# Patient Record
Sex: MALE | Race: WHITE | Employment: OTHER | URBAN - METROPOLITAN AREA
[De-identification: names, ages, dates, MRNs, and addresses within clinical notes are randomized per-mention and may not be internally consistent; named-entity substitution may affect disease eponyms.]

---

## 2020-08-11 ENCOUNTER — OFFICE VISIT (OUTPATIENT)
Dept: ORTHOPEDIC SURGERY | Age: 75
End: 2020-08-11

## 2020-08-11 VITALS — WEIGHT: 180 LBS | HEIGHT: 70 IN | BODY MASS INDEX: 25.77 KG/M2

## 2020-08-11 DIAGNOSIS — M25.562 LEFT KNEE PAIN, UNSPECIFIED CHRONICITY: Primary | ICD-10-CM

## 2020-08-11 DIAGNOSIS — M25.569 CHRONIC KNEE PAIN, UNSPECIFIED LATERALITY: ICD-10-CM

## 2020-08-11 DIAGNOSIS — Z79.01 CHRONIC ANTICOAGULATION: ICD-10-CM

## 2020-08-11 DIAGNOSIS — I48.11 LONGSTANDING PERSISTENT ATRIAL FIBRILLATION (HCC): ICD-10-CM

## 2020-08-11 DIAGNOSIS — G89.29 CHRONIC KNEE PAIN, UNSPECIFIED LATERALITY: ICD-10-CM

## 2020-08-11 DIAGNOSIS — M17.12 PRIMARY OSTEOARTHRITIS OF LEFT KNEE: Primary | ICD-10-CM

## 2020-08-11 RX ORDER — CEPHALEXIN 500 MG/1
500 CAPSULE ORAL 4 TIMES DAILY
Qty: 4 CAP | Refills: 0 | Status: CANCELLED | OUTPATIENT
Start: 2020-08-11 | End: 2020-08-12

## 2020-08-11 RX ORDER — AMLODIPINE BESYLATE 10 MG/1
1 TABLET ORAL DAILY
COMMUNITY
Start: 2019-10-28

## 2020-08-11 RX ORDER — OXYCODONE AND ACETAMINOPHEN 5; 325 MG/1; MG/1
1 TABLET ORAL
Qty: 30 TAB | Refills: 0 | Status: CANCELLED | OUTPATIENT
Start: 2020-08-11 | End: 2020-08-18

## 2020-08-11 RX ORDER — NALOXONE HYDROCHLORIDE 4 MG/.1ML
SPRAY NASAL
COMMUNITY

## 2020-08-11 RX ORDER — METOPROLOL TARTRATE 25 MG/1
TABLET, FILM COATED ORAL
COMMUNITY
Start: 2020-07-20

## 2020-08-11 RX ORDER — GABAPENTIN 300 MG/1
900 CAPSULE ORAL
COMMUNITY

## 2020-08-11 RX ORDER — TADALAFIL 5 MG/1
5 TABLET ORAL DAILY
COMMUNITY
Start: 2020-07-13

## 2020-08-11 RX ORDER — BUSPIRONE HYDROCHLORIDE 15 MG/1
15 TABLET ORAL 2 TIMES DAILY
COMMUNITY
Start: 2019-06-30

## 2020-08-11 RX ORDER — OXYCODONE AND ACETAMINOPHEN 5; 325 MG/1; MG/1
TABLET ORAL
COMMUNITY
End: 2020-08-12 | Stop reason: SDUPTHER

## 2020-08-11 RX ORDER — ACETAMINOPHEN AND CODEINE PHOSPHATE 300; 30 MG/1; MG/1
TABLET ORAL
COMMUNITY

## 2020-08-11 RX ORDER — ESZOPICLONE 3 MG/1
3 TABLET, FILM COATED ORAL
COMMUNITY

## 2020-08-11 RX ORDER — SERTRALINE HYDROCHLORIDE 50 MG/1
50 TABLET, FILM COATED ORAL DAILY
COMMUNITY

## 2020-08-11 RX ORDER — MELOXICAM 15 MG/1
15 TABLET ORAL DAILY
COMMUNITY
End: 2020-11-16 | Stop reason: ALTCHOICE

## 2020-08-11 RX ORDER — DESMOPRESSIN ACETATE 55.3 UG/1
55.3 TABLET SUBLINGUAL
COMMUNITY
Start: 2020-07-13

## 2020-08-11 RX ORDER — CLINDAMYCIN HYDROCHLORIDE 300 MG/1
300 CAPSULE ORAL 3 TIMES DAILY
Qty: 3 CAP | Refills: 0 | Status: SHIPPED | OUTPATIENT
Start: 2020-08-11 | End: 2020-08-12 | Stop reason: SDUPTHER

## 2020-08-11 RX ORDER — CLINDAMYCIN HYDROCHLORIDE 300 MG/1
300 CAPSULE ORAL 3 TIMES DAILY
Qty: 18 CAP | Refills: 0 | Status: SHIPPED | OUTPATIENT
Start: 2020-08-11 | End: 2020-08-12 | Stop reason: SDUPTHER

## 2020-08-11 RX ORDER — HYDROCODONE BITARTRATE AND ACETAMINOPHEN 7.5; 325 MG/1; MG/1
TABLET ORAL
COMMUNITY

## 2020-08-11 RX ORDER — ALPRAZOLAM 0.25 MG/1
TABLET ORAL
COMMUNITY

## 2020-08-11 NOTE — PROGRESS NOTES
[employer] and [facility]  Preop Visit    Subjective:     Shea Caballero is a 69 y.o. male who presents today for preoperative visit in preparation for upcoming left TKA  . Xrays and additional studies, as appropriate, have been reviewed. Pt currently without complaint. Previous medical history    1. Anxiety  2. Atrial fibrillation on chronic anticoagulation with Eliquis  3. Benign prostatic hyperplasia  4.  carotid stenosis bilateral  5.  chronic pain syndrome; I have explained to patient that we will provide pain management with regard to acute pain from surgery for 4 to 6 weeks at which point he will have to return to his pain specialist and he voices an understanding. 6.  Current use of long-term anticoagulation  7. Eczema  8. Hypertension  9. History of kidney stones  10. Mitral regurgitation and mitral valve prolapse  11. Recurrent major depressive disorder      The patient has had  11 back surgeries      Allergies   Allergen Reactions    Cefuroxime Axetil Anaphylaxis    Cephalexin Other (comments)    Eszopiclone Other (comments)     Cannot take generic version of Lunesta  Cannot take generic version of Lunesta         ROS:    Patient is a pleasant appearing individual, appropriately dressed, well hydrated, well nourished, who is alert, appropriately oriented for age, and in no acute distress with a limp gait and normal affect who does not appear to be in any significant pain. Remainder of ROS as per HPI. Objective:     Physical Exam:  VSS AFEB    Right knee - Neurovascularly intact with good cap refill, full range of motion and full strength, well healed incision noted, no swelling, no erythema, no instability.      Left knee - Decrease range of motion with flexion, Some crepitation, Grossly neurovascularly intact, Good cap refill,  Moderate swelling, No gross instability, Some quadriceps weakness, the patient does have several small red marks on his knee above his patella. He states that multiple years ago he fell into a gravel pit and every once in a while picks out a piece of gravel. This is in the operative field. I have prescribed antibiotics up until surgery. EKG: Atrial fibrillation    2 weeks prior to this visit the patient had a syncopal episode related to his atrial fibrillation. The patient was seen by his cardiologist who performed cardiac clearance as outlined below. Cardiac evaluation: Patient was diagnosed with atrial fibrillation. His cardiologist states that he should discontinue his Eliquis 2 days prior to his scheduled orthopedic procedure and resume as soon as Dr. Olga Disla feels appropriate. He is to remain on his Lopressor throughout his surgical course and that currently there are no contraindications to undergoing any needed procedure. This evaluation is performed by Dr. Devang Wright. General Medicine evaluation: Patient has been cleared by [facility] family medicine physicians who state there are no contraindications to planned surgery. Post operative Antibiotics: Called in, Keflex  Post operative Pain Medications: Called in, oxycodone  Post operative Blood thinning meds: Eliquis    Post operative medications, DME and physical therapy (if indicated) prescriptions have been provided. (Meds sent to pharmacy)    Assessment:   Primary osteoarthritis of knee, unspecified laterality [M17.10]   Orders Placed This Encounter    apixaban (ELIQUIS) 5 mg tablet     Sig: Take 5 mg by mouth.  amLODIPine (NORVASC) 10 mg tablet     Sig: Take 1 Tab by mouth daily.  ALPRAZolam (XANAX) 0.25 mg tablet     Sig: alprazolam 0.25 mg tablet   TAKE 1 TABLET BY MOUTH TWICE DAILY AS NEEDED FOR SEVERE ANXIETY AND PANIC AS DIRECTED    acetaminophen-codeine (TYLENOL #3) 300-30 mg per tablet     Sig: acetaminophen 300 mg-codeine 30 mg tablet    busPIRone (BUSPAR) 15 mg tablet     Sig: Take 15 mg by mouth two (2) times a day.     desmopressin (Nocdurna, men,) 55.3 mcg TbDi     Si.3 mcg by SubLINGual route.  eszopiclone (LUNESTA) 3 mg tablet     Sig: Take 3 mg by mouth.  gabapentin (NEURONTIN) 300 mg capsule     Sig: Take 900 mg by mouth.  HYDROcodone-acetaminophen (Norco) 7.5-325 mg per tablet     Sig: Norco 7.5 mg-325 mg tablet   Take 1 tablet 3 times a day by oral route as needed for 20 days.  meloxicam (MOBIC) 15 mg tablet     Sig: Take 15 mg by mouth daily.  metoprolol tartrate (LOPRESSOR) 25 mg tablet    naloxone (Narcan) 4 mg/actuation nasal spray     Sig: Narcan 4 mg/actuation nasal spray    oxyCODONE-acetaminophen (PERCOCET) 5-325 mg per tablet     Sig: oxycodone-acetaminophen 5 mg-325 mg tablet    sertraline (ZOLOFT) 50 mg tablet     Sig: Take 50 mg by mouth daily.  tadalafiL (CIALIS) 5 mg tablet     Sig: Take 5 mg by mouth daily.  clindamycin (CLEOCIN) 300 mg capsule     Sig: Take 1 Cap by mouth three (3) times daily for 1 day. Take starting day after surgery. Dispense:  3 Cap     Refill:  0    clindamycin (CLEOCIN) 300 mg capsule     Sig: Take 1 Cap by mouth three (3) times daily for 6 days. Start TODAY. Dispense:  18 Cap     Refill:  0       Please note that this patient will be scheduled for surgery and that Dr. Max Gunn is requesting a pre-op medical clearance for GENERAL Anesthesia. Our office will schedule the appt for the medical clearance with the medical provider. After evaluating the patient, please fax all labs, EKGs, diagnostic studies, and a risk assessment / clearance note (that is legible or typed ) indicating if the patient is medically cleared to Dr. Max Gunn (Attn: Surgery Scheduling) ASA to [phone]. If the patient is not or will not be medically cleared prior to the surgery date please notify Dr. Gallo Pulling office. Thank you for assisting me in this patient's care.    The patient was counseled about the risks of margot Covid-19 during their perioperative period and any recovery window from their procedure. The patient was made aware that margot Covid-19 may worsen their prognosis for recovering from their procedure and lend to a higher morbidity and/or mortality risk. All material risks, benefits, and reasonable alternatives including postponing the procedure were discussed. The patient DOES wish to proceed with their procedure at this time. Yenny Kapoor is a 69 y.o. male who is planning to undergo a left TKA by  Dr. Dana Kan in the near future. We will plan to proceed on an inpatient basis based on the patient's history, however, the patient would very much like to be outpatient so we will confer with Dr. Dana Kan and anesthesia at the time of surgery and make a decision at that time. In the interim we will list him as inpatient but provide him home medications should he be switched to outpatient at the time of surgery. He is agreeable with this plan. At this time, they are an appropriate candidate for surgery. The risks, benefits, complications and alternatives have been outlined with the patient at length and they voice an understanding. Based on the fact that we agree that the potential benefits outweigh the potential lists, we will plan to proceed as discussed. Mr. Jack Quinonez has a reminder for a \"due or due soon\" health maintenance. I have asked that he contact his primary care provider for follow-up on this health maintenance.     Problem List Items Addressed This Visit     None      Visit Diagnoses     Primary osteoarthritis of left knee    -  Primary    Relevant Medications    acetaminophen-codeine (TYLENOL #3) 300-30 mg per tablet    HYDROcodone-acetaminophen (Norco) 7.5-325 mg per tablet    meloxicam (MOBIC) 15 mg tablet    oxyCODONE-acetaminophen (PERCOCET) 5-325 mg per tablet    clindamycin (CLEOCIN) 300 mg capsule    clindamycin (CLEOCIN) 300 mg capsule    Chronic knee pain, unspecified laterality        Relevant Medications    clindamycin (CLEOCIN) 300 mg capsule clindamycin (CLEOCIN) 300 mg capsule    Longstanding persistent atrial fibrillation (HCC)        Relevant Medications    apixaban (ELIQUIS) 5 mg tablet    amLODIPine (NORVASC) 10 mg tablet    metoprolol tartrate (LOPRESSOR) 25 mg tablet    Chronic anticoagulation            Plan:   -Proceed with Left TKA  to be performed by DR. Carly Brower at [facility].  He will tentatively be performed on an inpatient basis given his medical history and recent syncopal episode  -Preoperative instructions have been reviewed with and provided to the patient, at length  -Patient voices understanding that any skin abnormality to the involved area of planned surgery may result in cancellation and postponement of surgery.  -Physical therapy, if indicated,  to start shortly after surgery.  -Follow up 1 week postoperatively with me for close follow up.     Adarsh Loja, MS, PAEverC

## 2020-08-12 DIAGNOSIS — M25.569 KNEE PAIN, UNSPECIFIED CHRONICITY, UNSPECIFIED LATERALITY: Primary | ICD-10-CM

## 2020-08-12 DIAGNOSIS — M17.10 PRIMARY OSTEOARTHRITIS OF KNEE, UNSPECIFIED LATERALITY: ICD-10-CM

## 2020-08-12 RX ORDER — CLINDAMYCIN HYDROCHLORIDE 300 MG/1
300 CAPSULE ORAL 3 TIMES DAILY
Qty: 21 CAP | Refills: 0 | Status: SHIPPED | OUTPATIENT
Start: 2020-08-12 | End: 2020-08-19

## 2020-08-12 RX ORDER — OXYCODONE AND ACETAMINOPHEN 5; 325 MG/1; MG/1
1 TABLET ORAL
Qty: 30 TAB | Refills: 0 | Status: SHIPPED | OUTPATIENT
Start: 2020-08-12 | End: 2020-08-19

## 2020-08-18 DIAGNOSIS — M17.12 PRIMARY OSTEOARTHRITIS OF LEFT KNEE: Primary | ICD-10-CM

## 2020-08-18 NOTE — PROGRESS NOTES
Name: Papito Tobar    : 1945  Service Dept: [facility] MEDICINE         No chief complaint on file. Patient's Pharmacies:    [address] [address] [address] [place]  [address] [place], [place] [address]  Phone: [phone] Fax: [phone]       There were no vitals taken for this visit. Allergies   Allergen Reactions    Cefuroxime Axetil Anaphylaxis    Cephalexin Other (comments)    Eszopiclone Other (comments)     Cannot take generic version of Lunesta  Cannot take generic version of Lunesta          Current Outpatient Medications   Medication Sig Dispense Refill    oxyCODONE-acetaminophen (Percocet) 5-325 mg per tablet Take 1 Tab by mouth every four to six (4-6) hours as needed for Pain for up to 7 days. Max Daily Amount: 6 Tabs. 30 Tab 0    clindamycin (CLEOCIN) 300 mg capsule Take 1 Cap by mouth three (3) times daily for 7 days. Start today and continue with one day postop. 21 Cap 0    apixaban (ELIQUIS) 5 mg tablet Take 5 mg by mouth.  amLODIPine (NORVASC) 10 mg tablet Take 1 Tab by mouth daily.  ALPRAZolam (XANAX) 0.25 mg tablet alprazolam 0.25 mg tablet   TAKE 1 TABLET BY MOUTH TWICE DAILY AS NEEDED FOR SEVERE ANXIETY AND PANIC AS DIRECTED      acetaminophen-codeine (TYLENOL #3) 300-30 mg per tablet acetaminophen 300 mg-codeine 30 mg tablet      busPIRone (BUSPAR) 15 mg tablet Take 15 mg by mouth two (2) times a day.  desmopressin (Nocdurna, men,) 55.3 mcg TbDi 55.3 mcg by SubLINGual route.  eszopiclone (LUNESTA) 3 mg tablet Take 3 mg by mouth.  gabapentin (NEURONTIN) 300 mg capsule Take 900 mg by mouth.  HYDROcodone-acetaminophen (Norco) 7.5-325 mg per tablet Norco 7.5 mg-325 mg tablet   Take 1 tablet 3 times a day by oral route as needed for 20 days.  meloxicam (MOBIC) 15 mg tablet Take 15 mg by mouth daily.       metoprolol tartrate (LOPRESSOR) 25 mg tablet       naloxone (Narcan) 4 mg/actuation nasal spray Narcan 4 mg/actuation nasal spray      sertraline (ZOLOFT) 50 mg tablet Take 50 mg by mouth daily.  tadalafiL (CIALIS) 5 mg tablet Take 5 mg by mouth daily. There is no problem list on file for this patient. No family history on file. Social History     Socioeconomic History    Marital status:      Spouse name: Not on file    Number of children: Not on file    Years of education: Not on file    Highest education level: Not on file        No past surgical history on file. No past medical history on file. HPI:   I have reviewed and agree with PFSH and ROS and intake form in chart and the record. Orders Only - link in [facility] in error       Return to Office:    Follow-up Information    None

## 2020-08-19 ENCOUNTER — OFFICE VISIT (OUTPATIENT)
Dept: ORTHOPEDIC SURGERY | Age: 75
End: 2020-08-19
Payer: MEDICARE

## 2020-08-19 DIAGNOSIS — M25.562 LEFT KNEE PAIN, UNSPECIFIED CHRONICITY: Primary | ICD-10-CM

## 2020-08-19 PROCEDURE — 99024 POSTOP FOLLOW-UP VISIT: CPT | Performed by: ORTHOPAEDIC SURGERY

## 2020-08-19 PROCEDURE — 73564 X-RAY EXAM KNEE 4 OR MORE: CPT | Performed by: ORTHOPAEDIC SURGERY

## 2020-08-20 LAB — CREATININE, EXTERNAL: 1.1

## 2020-08-21 VITALS — HEIGHT: 70 IN | BODY MASS INDEX: 25.05 KG/M2 | WEIGHT: 175 LBS

## 2020-08-21 NOTE — PROGRESS NOTES
Name: Chidi Bro    : 1945  Service Dept: [address] MEDICINE         No chief complaint on file. Patient's Pharmacies:    [facility] [address], [address] [place]  [address] [place] [address]  Phone: [phone] Fax: [phone]       There were no vitals taken for this visit. Allergies   Allergen Reactions    Cefuroxime Axetil Anaphylaxis    Cephalexin Other (comments)    Eszopiclone Other (comments)     Cannot take generic version of Lunesta  Cannot take generic version of Lunesta          Current Outpatient Medications   Medication Sig Dispense Refill    amLODIPine (NORVASC) 10 mg tablet Take 1 Tab by mouth daily.  ALPRAZolam (XANAX) 0.25 mg tablet alprazolam 0.25 mg tablet   TAKE 1 TABLET BY MOUTH TWICE DAILY AS NEEDED FOR SEVERE ANXIETY AND PANIC AS DIRECTED      acetaminophen-codeine (TYLENOL #3) 300-30 mg per tablet acetaminophen 300 mg-codeine 30 mg tablet      busPIRone (BUSPAR) 15 mg tablet Take 15 mg by mouth two (2) times a day.  desmopressin (Nocdurna, men,) 55.3 mcg TbDi 55.3 mcg by SubLINGual route.  eszopiclone (LUNESTA) 3 mg tablet Take 3 mg by mouth.  gabapentin (NEURONTIN) 300 mg capsule Take 900 mg by mouth.  HYDROcodone-acetaminophen (Norco) 7.5-325 mg per tablet Norco 7.5 mg-325 mg tablet   Take 1 tablet 3 times a day by oral route as needed for 20 days.  meloxicam (MOBIC) 15 mg tablet Take 15 mg by mouth daily.  metoprolol tartrate (LOPRESSOR) 25 mg tablet       naloxone (Narcan) 4 mg/actuation nasal spray Narcan 4 mg/actuation nasal spray      sertraline (ZOLOFT) 50 mg tablet Take 50 mg by mouth daily.  tadalafiL (CIALIS) 5 mg tablet Take 5 mg by mouth daily. There is no problem list on file for this patient. No family history on file.      Social History     Socioeconomic History    Marital status:      Spouse name: Not on file    Number of children: Not on file    Years of education: Not on file    Highest education level: Not on file        No past surgical history on file. No past medical history on file. I have reviewed and agree with [address] and ROS and intake form in chart and the record. Review of Systems:   Patient is a pleasant appearing individual, appropriately dressed, well hydrated, well nourished, who is alert, appropriately oriented for age, and in no acute distress with a wheelchair bound gait and normal affect who does not appear to be in any significant pain. xrays revealed well placed prosthesis with no fractures. Scribed by Clair Epley as dictated by Jeanmarie Edouard. Jose Sanchez MD.    Physical examination shows well-healing incision, good capillary refill, grossly neurovascularly intact, no instability. HPI:  The patient is here status post left total knee replacement, was having some issues with lightheadedness. We brought him into the office. X-rays of the left knee, AP, lateral and obliques are unremarkable. Assessment/Plan:  Plan at this point, he is having some vasovagal like symptoms. Plan will be to send him to the emergency room and go from there. Return to Office: Follow-up Information    None             Documentation True and Accepted Leroy Sanchez MD

## 2020-08-21 NOTE — PATIENT INSTRUCTIONS
Knee Pain or Injury: Care Instructions Your Care Instructions Injuries are a common cause of knee problems. Sudden (acute) injuries may be caused by a direct blow to the knee. They can also be caused by abnormal twisting, bending, or falling on the knee. Pain, bruising, or swelling may be severe, and may start within minutes of the injury. Overuse is another cause of knee pain. Other causes are climbing stairs, kneeling, and other activities that use the knee. Everyday wear and tear, especially as you get older, also can cause knee pain. Rest, along with home treatment, often relieves pain and allows your knee to heal. If you have a serious knee injury, you may need tests and treatment. Follow-up care is a key part of your treatment and safety. Be sure to make and go to all appointments, and call your doctor if you are having problems. It's also a good idea to know your test results and keep a list of the medicines you take. How can you care for yourself at home? · Be safe with medicines. Read and follow all instructions on the label. ? If the doctor gave you a prescription medicine for pain, take it as prescribed. ? If you are not taking a prescription pain medicine, ask your doctor if you can take an over-the-counter medicine. · Rest and protect your knee. Take a break from any activity that may cause pain. · Put ice or a cold pack on your knee for 10 to 20 minutes at a time. Put a thin cloth between the ice and your skin. · Prop up a sore knee on a pillow when you ice it or anytime you sit or lie down for the next 3 days. Try to keep it above the level of your heart. This will help reduce swelling. · If your knee is not swollen, you can put moist heat, a heating pad, or a warm cloth on your knee. · If your doctor recommends an elastic bandage, sleeve, or other type of support for your knee, wear it as directed.  
· Follow your doctor's instructions about how much weight you can put on your leg. Use a cane, crutches, or a walker as instructed. · Follow your doctor's instructions about activity during your healing process. If you can do mild exercise, slowly increase your activity. · Reach and stay at a healthy weight. Extra weight can strain the joints, especially the knees and hips, and make the pain worse. Losing even a few pounds may help. When should you call for help? [id number] anytime you think you may need emergency care. For example, call if: 
· You have symptoms of a blood clot in your lung (called a pulmonary embolism). These may include: 
? Sudden chest pain. ? Trouble breathing. ? Coughing up blood. Call your doctor now or seek immediate medical care if: 
· You have severe or increasing pain. · Your leg or foot turns cold or changes color. · You cannot stand or put weight on your knee. · Your knee looks twisted or bent out of shape. · You cannot move your knee. · You have signs of infection, such as: 
? Increased pain, swelling, warmth, or redness. ? Red streaks leading from the knee. ? Pus draining from a place on your knee. ? A fever. · You have signs of a blood clot in your leg (called a deep vein thrombosis), such as: 
? Pain in your calf, back of the knee, thigh, or groin. ? Redness and swelling in your leg or groin. Watch closely for changes in your health, and be sure to contact your doctor if: 
· You have tingling, weakness, or numbness in your knee. · You have any new symptoms, such as swelling. · You have bruises from a knee injury that last longer than 2 weeks. · You do not get better as expected. Where can you learn more? Go to http://yemi-landy.info/ Enter K195 in the search box to learn more about \"Knee Pain or Injury: Care Instructions. \" Current as of: June 26, 2019               Content Version: 12.5 © 4282-3157 [employer]wise, [employer]orporated.   
Care instructions adapted under license by [employer] (which disclaims liability or warranty for this information). If you have questions about a medical condition or this instruction, always ask your healthcare professional. [address] any warranty or liability for your use of this information.

## 2020-08-24 ENCOUNTER — TELEPHONE (OUTPATIENT)
Dept: ORTHOPEDIC SURGERY | Age: 75
End: 2020-08-24

## 2020-08-24 NOTE — TELEPHONE ENCOUNTER
LT TKR 08/17/2020. . IS DIANA IN [facility] ICU HE IS NOT SURE WHAT RM HE IS IN BUT HE WOULD LIKE FOR SOMEONE TO COME OVER AND LOOK AT THIS INCISION.

## 2020-09-01 ENCOUNTER — OFFICE VISIT (OUTPATIENT)
Dept: ORTHOPEDIC SURGERY | Age: 75
End: 2020-09-01
Payer: MEDICARE

## 2020-09-01 VITALS — TEMPERATURE: 97.8 F

## 2020-09-01 DIAGNOSIS — Z96.652 HISTORY OF TOTAL LEFT KNEE REPLACEMENT: ICD-10-CM

## 2020-09-01 DIAGNOSIS — M25.562 LEFT KNEE PAIN, UNSPECIFIED CHRONICITY: Primary | ICD-10-CM

## 2020-09-01 DIAGNOSIS — M17.12 PRIMARY OSTEOARTHRITIS OF LEFT KNEE: ICD-10-CM

## 2020-09-01 PROCEDURE — 99024 POSTOP FOLLOW-UP VISIT: CPT | Performed by: ORTHOPAEDIC SURGERY

## 2020-09-01 RX ORDER — HYDROMORPHONE HYDROCHLORIDE 2 MG/1
2 TABLET ORAL
Qty: 30 TAB | Refills: 0 | Status: SHIPPED | OUTPATIENT
Start: 2020-09-01 | End: 2020-09-08

## 2020-09-01 NOTE — PATIENT INSTRUCTIONS
Knee Pain or Injury: Care Instructions  Your Care Instructions     Injuries are a common cause of knee problems. Sudden (acute) injuries may be caused by a direct blow to the knee. They can also be caused by abnormal twisting, bending, or falling on the knee. Pain, bruising, or swelling may be severe, and may start within minutes of the injury. Overuse is another cause of knee pain. Other causes are climbing stairs, kneeling, and other activities that use the knee. Everyday wear and tear, especially as you get older, also can cause knee pain. Rest, along with home treatment, often relieves pain and allows your knee to heal. If you have a serious knee injury, you may need tests and treatment. Follow-up care is a key part of your treatment and safety. Be sure to make and go to all appointments, and call your doctor if you are having problems. It's also a good idea to know your test results and keep a list of the medicines you take. How can you care for yourself at home? · Be safe with medicines. Read and follow all instructions on the label. ? If the doctor gave you a prescription medicine for pain, take it as prescribed. ? If you are not taking a prescription pain medicine, ask your doctor if you can take an over-the-counter medicine. · Rest and protect your knee. Take a break from any activity that may cause pain. · Put ice or a cold pack on your knee for 10 to 20 minutes at a time. Put a thin cloth between the ice and your skin. · Prop up a sore knee on a pillow when you ice it or anytime you sit or lie down for the next 3 days. Try to keep it above the level of your heart. This will help reduce swelling. · If your knee is not swollen, you can put moist heat, a heating pad, or a warm cloth on your knee. · If your doctor recommends an elastic bandage, sleeve, or other type of support for your knee, wear it as directed.   · Follow your doctor's instructions about how much weight you can put on your leg. Use a cane, crutches, or a walker as instructed. · Follow your doctor's instructions about activity during your healing process. If you can do mild exercise, slowly increase your activity. · Reach and stay at a healthy weight. Extra weight can strain the joints, especially the knees and hips, and make the pain worse. Losing even a few pounds may help. When should you call for help? [id number] anytime you think you may need emergency care. For example, call if:  · You have symptoms of a blood clot in your lung (called a pulmonary embolism). These may include:  ? Sudden chest pain. ? Trouble breathing. ? Coughing up blood. Call your doctor now or seek immediate medical care if:  · You have severe or increasing pain. · Your leg or foot turns cold or changes color. · You cannot stand or put weight on your knee. · Your knee looks twisted or bent out of shape. · You cannot move your knee. · You have signs of infection, such as:  ? Increased pain, swelling, warmth, or redness. ? Red streaks leading from the knee. ? Pus draining from a place on your knee. ? A fever. · You have signs of a blood clot in your leg (called a deep vein thrombosis), such as:  ? Pain in your calf, back of the knee, thigh, or groin. ? Redness and swelling in your leg or groin. Watch closely for changes in your health, and be sure to contact your doctor if:  · You have tingling, weakness, or numbness in your knee. · You have any new symptoms, such as swelling. · You have bruises from a knee injury that last longer than 2 weeks. · You do not get better as expected. Where can you learn more? Go to http://www.gray.com/  Enter K195 in the search box to learn more about \"Knee Pain or Injury: Care Instructions. \"  Current as of: June 26, 2019               Content Version: 12.5  © 9350-6609 Healthwise, Incorporated.    Care instructions adapted under license by [employer] (which disclaims liability or warranty for this information). If you have questions about a medical condition or this instruction, always ask your healthcare professional. [address] [address] any warranty or liability for your use of this information.

## 2020-09-01 NOTE — PROGRESS NOTES
Name: Anais Serrano    : 1945  Service Dept: [address] MEDICINE         Chief Complaint   Patient presents with    Surgical Follow-up        Patient's Pharmacies:    [facility] [address], [address] [place]  [address] [place] [address]  [place] [id number]  Phone: [phone] Fax: [phone]       Visit Vitals  Temp 97.8 °F (36.6 °C)        Allergies   Allergen Reactions    Cefuroxime Axetil Anaphylaxis    Cephalexin Other (comments)    Eszopiclone Other (comments)     Cannot take generic version of Lunesta  Cannot take generic version of Lunesta          Current Outpatient Medications   Medication Sig Dispense Refill    HYDROmorphone (Dilaudid) 2 mg tablet Take 1 Tab by mouth every four to six (4-6) hours as needed for Pain (May take second tab for severe pain.) for up to 7 days. Max Daily Amount: 12 mg. 30 Tab 0    amLODIPine (NORVASC) 10 mg tablet Take 1 Tab by mouth daily.  ALPRAZolam (XANAX) 0.25 mg tablet alprazolam 0.25 mg tablet   TAKE 1 TABLET BY MOUTH TWICE DAILY AS NEEDED FOR SEVERE ANXIETY AND PANIC AS DIRECTED      acetaminophen-codeine (TYLENOL #3) 300-30 mg per tablet acetaminophen 300 mg-codeine 30 mg tablet      busPIRone (BUSPAR) 15 mg tablet Take 15 mg by mouth two (2) times a day.  desmopressin (Nocdurna, men,) 55.3 mcg TbDi 55.3 mcg by SubLINGual route.  eszopiclone (LUNESTA) 3 mg tablet Take 3 mg by mouth.  gabapentin (NEURONTIN) 300 mg capsule Take 900 mg by mouth.  HYDROcodone-acetaminophen (Norco) 7.5-325 mg per tablet Norco 7.5 mg-325 mg tablet   Take 1 tablet 3 times a day by oral route as needed for 20 days.  meloxicam (MOBIC) 15 mg tablet Take 15 mg by mouth daily.  metoprolol tartrate (LOPRESSOR) 25 mg tablet       naloxone (Narcan) 4 mg/actuation nasal spray Narcan 4 mg/actuation nasal spray      sertraline (ZOLOFT) 50 mg tablet Take 50 mg by mouth daily.       tadalafiL (CIALIS) 5 mg tablet Take 5 mg by mouth daily. There is no problem list on file for this patient. Family History   Problem Relation Age of Onset    No Known Problems Mother     No Known Problems Father         Social History     Socioeconomic History    Marital status:      Spouse name: Not on file    Number of children: Not on file    Years of education: Not on file    Highest education level: Not on file   Tobacco Use    Smoking status: Former Smoker    Smokeless tobacco: Never Used   Substance and Sexual Activity    Alcohol use: Never     Frequency: Never        No past surgical history on file. Past Medical History:   Diagnosis Date    Arthritis     Hypertension         HPI:   I have reviewed and agree with PFSH and ROS and intake form in chart and the record. Review of Systems:   Patient is a pleasant appearing individual, appropriately dressed, well hydrated, well nourished, who is alert, appropriately oriented for age, and in no acute distress with a cane based gait and normal affect who does not appear to be in any significant pain. Physical Exam:  Left knee - Neurovascularly intact with good cap refill, full range of motion and full strength, well healed incision noted, no swelling, no erythema, no instability. Right knee - Decrease range of motion with flexion, Some crepitation, Grossly neurovascularly intact, Good cap refill, No skin lesion, Moderate swelling, No gross instability, Some quadriceps weakness     HPI:  The patient is here status post left total knee replacement, doing well, just a little bit of soreness. He is much improved compared to before. Assessment/Plan:  Plan at this point he will see my PA in about 4-6 weeks and then he will go to yearly appointment. No more stockings needed, no restrictions in the meantime and go from there. If he gets worse he will give me a call. Return to Office:    Follow-up Information    None

## 2020-09-01 NOTE — PROGRESS NOTES
Providers protocol for the intake nurse to complete in patient's chart:    Norm Flurry presents today for   Chief Complaint   Patient presents with    Surgical Follow-up     No intake information available.     Reason for visit  Pain assessment   Height  Weight    Temperature is taken by [facility]  Travel Screening done by [facility]    Provider will complete the patient's chart

## 2020-09-02 DIAGNOSIS — M17.12 PRIMARY OSTEOARTHRITIS OF LEFT KNEE: Primary | ICD-10-CM

## 2020-09-11 ENCOUNTER — TELEPHONE (OUTPATIENT)
Dept: ORTHOPEDIC SURGERY | Age: 75
End: 2020-09-11

## 2020-09-11 DIAGNOSIS — Z96.652 HISTORY OF TOTAL LEFT KNEE REPLACEMENT: Primary | ICD-10-CM

## 2020-09-11 DIAGNOSIS — M25.562 LEFT KNEE PAIN, UNSPECIFIED CHRONICITY: ICD-10-CM

## 2020-09-11 RX ORDER — OXYCODONE AND ACETAMINOPHEN 5; 325 MG/1; MG/1
1 TABLET ORAL
Qty: 30 TAB | Refills: 0 | Status: SHIPPED | OUTPATIENT
Start: 2020-09-11 | End: 2020-09-18

## 2020-09-11 NOTE — TELEPHONE ENCOUNTER
YOU PUT IN TODAY AN RX FOR PERCOCET BUT MR DILLION CALLED AND SAID IT WAS SUPPOSE TO BE FOR DAULIDID  HE DID NOT  THE PERCOCET

## 2020-09-13 DIAGNOSIS — M25.562 LEFT KNEE PAIN, UNSPECIFIED CHRONICITY: Primary | ICD-10-CM

## 2020-09-13 DIAGNOSIS — Z96.652 HISTORY OF TOTAL LEFT KNEE REPLACEMENT: ICD-10-CM

## 2020-09-13 DIAGNOSIS — M17.12 PRIMARY OSTEOARTHRITIS OF LEFT KNEE: ICD-10-CM

## 2020-09-13 RX ORDER — HYDROMORPHONE HYDROCHLORIDE 2 MG/1
2 TABLET ORAL
Qty: 30 TAB | Refills: 0 | Status: SHIPPED | OUTPATIENT
Start: 2020-09-13 | End: 2020-09-20

## 2020-09-14 ENCOUNTER — HOSPITAL ENCOUNTER (OUTPATIENT)
Dept: PHYSICAL THERAPY | Age: 75
Discharge: HOME | End: 2020-09-14
Payer: MEDICARE

## 2020-09-14 PROCEDURE — 97110 THERAPEUTIC EXERCISES: CPT

## 2020-09-14 NOTE — PROGRESS NOTES
PT DAILY TREATMENT NOTE     Patient Name: Branden Diggs  Date:2020  : 1945  [x]  Patient  Verified  Payor: Idolina Babinski / Plan: [place] MED[place] & B / Product Type: Medicare /    In time:1309  Out time:1348  Total Treatment Time (min): 39  Total Timed Codes (min): 38    Treatment Area: Unilateral primary osteoarthritis, left knee [M17.12]    SUBJECTIVE  Pain Level (0-10 scale): 7/10, Pt states that pain doesn't seem to get any better. Any medication changes, allergies to medications, adverse drug reactions, diagnosis change, or new procedure performed?: [x] No    [] Yes (see summary sheet for update)  Subjective functional status/changes:   [x] No changes reported    OBJECTIVE        38 min Therapeutic Exercise:   See flow sheet :   Rationale: increase ROM, increase strength and improve coordination to improve the patients ability to ambulate without pain and no AD. Other Objective/Functional Measures: Pain seems to limit progress    Pain Level (0-10 scale) post treatment: 7/10    ASSESSMENT/Changes in Function: Pt making progress slowly with strength. Strap 110 flexion, and extension 5. Patient will continue to benefit from skilled PT services to address functional mobility deficits, address ROM deficits and address strength deficits to attain remaining goals.      [x]  See Plan of Care  []  See progress note/recertification  []  See Discharge Summary         Progress towards goals / Updated goals:  Cont to work on extension    PLAN  []  Upgrade activities as tolerated     [x]  Continue plan of care  []  Update interventions per flow sheet       []  Discharge due to:_  []  Other:_      Merlin Frederic, LPTA   2020  1:39 PM

## 2020-09-16 ENCOUNTER — HOSPITAL ENCOUNTER (OUTPATIENT)
Dept: PHYSICAL THERAPY | Age: 75
Discharge: HOME | End: 2020-09-16
Payer: MEDICARE

## 2020-09-16 PROCEDURE — 97110 THERAPEUTIC EXERCISES: CPT

## 2020-09-16 NOTE — PROGRESS NOTES
[facility], [address] [facility], [address] [place] [address]  Phone: [phone]    Fax: [phone]      Progress Note    Name: Humaira Leonardo   : 1945   MD: Alyson Gracia MD       Treatment Diagnosis: Pain in left knee [M25.562]  Start of Care: 2020    Visits from Start of Care: 9  Missed Visits: 2    Summary of Care:  Subjective:  Pt presents to clinic in [place], stating that when he was getting out of car to go to PT session, Pt felt his L knee was \"giving out\". Pt states his LLE feels \"weak\". Pt continues to complain of 7/10 L knee pain at start of today's Tx session. Objective:        AROM                      PROM                   Strength (1-5)    Left Right Left Right Left Right   Hip Flexion [facility] 3+/5 WFL         3+/5 WFL   Knee Flexion 102  WFL 3+/5 WFL    Extension -2 WFL -2 WFL 3+/5 WFL     Assessment / Recommendations:   Pt demonstrates significant improvements in tolerance to exercises, demonstrating ability to progress to more functional exercise (sit to stands). Pt also demonstrates significant improvements in L knee ROM during today's visit. Goals:  Short Term Goals:  1. Pt will perform > 4 steps independently. Status at progress note: No progress    2. Pt will be independent with HEP to improve L knee range of motion and strength in 3 weeks. Status at progress note: Met    3. Pt will demonstrate L knee PROM 0-120 degrees for improved ADL efficiency in 3 weeks. Status at progress note: Partially Met; -2-120 deg    4. Pt will report no greater than 6/10 pain in L knee with daily activity in 3 weeks. Status at progress note: Partially Met; 7/10    Long Term Goals:  1. Pt will improve L knee strength to 5/5 for improved ability to rise from seated position without difficulty in 6 weeks. Status at progress note: Partially Met; L knee gross MMT:3+/5    2.  Pt will improve L knee AROM to 0-120 degrees for improved ability to climb steps in/out of home in 6 weeks. Status at progress note: Partially Met; L knee AROM: -2-102 deg    3. Pt will report pain-free L knee mobility for ability to return to hobbies such as walking for exercise in 6 weeks. Status at progress note: Partially Met; 7/10    Plan:  Pt tolerated today's Tx well, progressing to more functional exercises and demonstrating increases in L knee ROM. PT to continue per POC. Pt could benefit from further skilled PT services to increase Pt ability to ambulate long distances, ascend/descend stairs, and to perform leisure activities of choice. Will Joy Beltran, PT, DPT 9/16/2020     ________________________________________________________________________  NOTE TO PHYSICIAN:  Please complete the following and fax to: [phone]  . Retain this original for your records. If you are unable to process this request in 24 hours, please contact our office.        ____ I have read the above report and request that my patient continue therapy with the following changes/special instructions:  ____ I have read the above report and request that my patient be discharged from therapy    Physician's Signature:_________________ Date:___________Time:__________

## 2020-09-16 NOTE — PROGRESS NOTES
PT DAILY TREATMENT NOTE     Patient Name: Titus Church  Date:2020  : 1945  [x]  Patient  Verified  Payor: [place] MEDICARE / Plan: [place] MEDICARE PART A & B / Product Type: Medicare /    In time:1317  Out time:1410  Total Treatment Time (min): 53  Total Timed Codes (min): 43  1:1 Treatment Time (min): 53   Visit #: 9    Treatment Area: Pain in left knee [M25.562]    SUBJECTIVE  Pain Level (0-10 scale): 7/10  Any medication changes, allergies to medications, adverse drug reactions, diagnosis change, or new procedure performed?: [x] No    [] Yes (see summary sheet for update)  Subjective functional status/changes:   [] No changes reported  Pt presents to clinic in [place] [address], stating that when he was getting out of car to go to PT session, Pt felt his L knee was \"giving out\". Pt states his LLE feels \"weak\". Pt continues to complain of 7/10 L knee pain at start of today's Tx session. OBJECTIVE  Modality rationale: decrease inflammation, decrease pain and increase tissue extensibility to improve the patients ability to ambulate long distances, ascend/descend stairs, and perform leisure activities of choice.    Min Type Additional Details    [] Estim: []Att   []Unatt  []TENS instruct                 []IFC  []Premod []NMES                       []Other:  []w/US   []w/ice   []w/heat  Position:  Location:    []  Traction: [] Cervical       []Lumbar                       [] Prone          []Supine                       []Intermittent   []Continuous Lbs:  [] before manual  [] after manual    []  Ultrasound: []Continuous   [] Pulsed                           []1MHz   []3MHz Location:  W/cm2:    []  Iontophoresis with dexamethasone         Location: [] Take home patch   [] In clinic   10 []  Ice     [x]  heat  []  Ice massage Position: Seated  Location: L knee    []  Vasopneumatic Device Pressure: [] lo [] med [] hi   Temp: [] lo [] med [] hi   [x] Skin assessment post-treatment:  [x]intact []redness- no adverse reaction       []redness  adverse reaction:       43 min Therapeutic Exercise:  [x] See flow sheet :   Rationale: increase ROM, increase strength and improve balance to improve the patients ability to ambulate long distances, ascend/descend stairs, and perform leisure activities of choice    Pain Level (0-10 scale) post treatment: Obdulia Nu Scale: 3/10    ASSESSMENT/Changes in Function:   Pt demonstrates significant improvements in tolerance to exercises, demonstrating ability to progress to more functional exercise (sit to stands). Pt also demonstrates significant improvements in L knee ROM during today's visit evidenced by measurements provided in flow sheet. Patient will continue to benefit from skilled PT services to modify and progress therapeutic interventions, address functional mobility deficits, address ROM deficits, address strength deficits and analyze and address soft tissue restrictions to attain remaining goals. [x]  See Plan of Care  [x]  See progress note/recertification  []  See Discharge Summary         Progress towards goals / Updated goals:  See recertification.     PLAN  [x]  Upgrade activities as tolerated     [x]  Continue plan of care  []  Update interventions per flow sheet       []  Discharge due to:_  []  Other:_      Marika Prado, PT, DPT 9/16/2020  3:03 PM

## 2020-09-18 ENCOUNTER — HOSPITAL ENCOUNTER (OUTPATIENT)
Dept: PHYSICAL THERAPY | Age: 75
Discharge: HOME | End: 2020-09-18
Payer: MEDICARE

## 2020-09-18 PROCEDURE — 97016 VASOPNEUMATIC DEVICE THERAPY: CPT

## 2020-09-18 PROCEDURE — 97110 THERAPEUTIC EXERCISES: CPT

## 2020-09-18 NOTE — PROGRESS NOTES
PT DAILY TREATMENT NOTE     Patient Name: Humaira Leonardo  Date:2020  : 1945  [x]  Patient  Verified  Payor: VA MEDICARE / Plan: VA MEDICARE PART A & B / Product Type: Medicare /    In time:1304  Out time:1409  Total Treatment Time (min): 65  Total Timed Codes (min): 45    Treatment Area: Pain in left knee [M25.562]    SUBJECTIVE  Pain Level (0-10 scale): 5/10  Any medication changes, allergies to medications, adverse drug reactions, diagnosis change, or new procedure performed?: [x] No    [] Yes (see summary sheet for update)  Subjective functional status/changes:   [x] No changes reported    Pt reports a little less pain on arrival today that he has had in the past.     OBJECTIVE  Modality rationale: decrease pain and increase muscle contraction/control to improve the patients ability to return to normal activivty. Min Type Additional Details    [] Estim: []Att   []Unatt  []TENS instruct                 []IFC  []Premod []NMES                       []Other:  []w/US   []w/ice   []w/heat  Position:  Location:    []  Traction: [] Cervical       []Lumbar                       [] Prone          []Supine                       []Intermittent   []Continuous Lbs:  [] before manual  [] after manual    []  Ultrasound: []Continuous   [] Pulsed                           []1MHz   []3MHz Location:  W/cm2:    []  Iontophoresis with dexamethasone         Location: [] Take home patch   [] In clinic    []  Ice     []  heat  []  Ice massage Position:  Location:    []  Vasopneumatic Device Pressure: [] lo [] med [] hi   Temp: [] lo [] med [] hi   [] Skin assessment post-treatment:  []intact []redness- no adverse reaction       []redness  adverse reaction:       45 min Therapeutic Exercise:  [] See flow sheet :   Rationale: increase ROM, improve coordination and improve balance to improve the patients ability to normal function.     Pain Level (0-10 scale) post treatment: 10    ASSESSMENT/Changes in Function: increase pain with hamstring stretches, added standing hip 3 way, and progressed pt to LBE with full revolutions today. Patient will continue to benefit from skilled PT services to address ROM deficits and address strength deficits to attain remaining goals.      [x]  See Plan of Care  []  See progress note/recertification  []  See Discharge Summary        PLAN  []  Upgrade activities as tolerated     [x]  Continue plan of care  []  Update interventions per flow sheet       []  Discharge due to:_  []  Other:_      Winslow Schwab 9/18/2020  2:18 PM

## 2020-09-21 ENCOUNTER — HOSPITAL ENCOUNTER (OUTPATIENT)
Dept: PHYSICAL THERAPY | Age: 75
Discharge: HOME | End: 2020-09-21
Payer: MEDICARE

## 2020-09-21 PROCEDURE — 97016 VASOPNEUMATIC DEVICE THERAPY: CPT

## 2020-09-21 PROCEDURE — 97110 THERAPEUTIC EXERCISES: CPT

## 2020-09-21 NOTE — PROGRESS NOTES
PT DAILY TREATMENT NOTE     Patient Name: Liliana Ambrocio  Date:2020  : 1945  [x]  Patient  Verified  Payor: VA MEDICARE / Plan: VA MEDICARE PART A & B / Product Type: Medicare /    In time:1325  Out time:1442  Total Treatment Time (min): 77   Total Timed Codes (min): 77    Treatment Area: Pain in left knee [M25.562]    SUBJECTIVE  Pain Level (0-10 scale): 7  Any medication changes, allergies to medications, adverse drug reactions, diagnosis change, or new procedure performed?: [x] No    [] Yes (see summary sheet for update)  Subjective functional status/changes:   [] No changes reported  Pt complains of soreness, stiffness, and tightness in L knee. OBJECTIVE  Modality rationale: decrease edema, decrease inflammation, decrease pain and increase tissue extensibility to improve the patients ability to maneuver L LE independent.     Min Type Additional Details    [] Estim: []Att   []Unatt  []TENS instruct                 []IFC  []Premod []NMES                       []Other:  []w/US   []w/ice   []w/heat  Position:  Location:    []  Traction: [] Cervical       []Lumbar                       [] Prone          []Supine                       []Intermittent   []Continuous Lbs:  [] before manual  [] after manual    []  Ultrasound: []Continuous   [] Pulsed                           []1MHz   []3MHz Location:  W/cm2:    []  Iontophoresis with dexamethasone         Location: [] Take home patch   [] In clinic   10 []  Ice     [x]  heat  []  Ice massage Position: Long sitting  Location: L knee   10 [x]  Vasopneumatic Device Pressure: [] lo [x] med [] hi   Temp: [] lo [] med [] hi   [] Skin assessment post-treatment:  []intact []redness- no adverse reaction       []redness  adverse reaction:       50 min Therapeutic Exercise:  [x] See flow sheet :   Rationale: increase ROM, increase strength and improve balance to improve the patients ability for proper ambulation, stair navigation, and improve independency with household activities. min Patient Education: [x] Review HEP    [] Progressed/Changed HEP based on:   [] positioning   [] body mechanics   [] transfers   [] heat/ice application        Other Objective/Functional Measures:     Pain Level (0-10 scale) post treatment: 5    ASSESSMENT/Changes in Function: Pt. Completed exercises needing extended rest breaks between sets and repetitions. Pt. Reported a \"locking\" in L knee when completing quad strengthening exercises which prevented TKE. Patient will continue to benefit from skilled PT services to address functional mobility deficits, address ROM deficits and address strength deficits to attain remaining goals.      [x]  See Plan of Care  []  See progress note/recertification  []  See Discharge Summary           PLAN  []  Upgrade activities as tolerated     [x]  Continue plan of care  []  Update interventions per flow sheet       []  Discharge due to:_  []  Other:_      ADDI Kathleen  9/21/2020  4:23 PM

## 2020-09-23 ENCOUNTER — HOSPITAL ENCOUNTER (OUTPATIENT)
Dept: PHYSICAL THERAPY | Age: 75
Discharge: HOME | End: 2020-09-23
Payer: MEDICARE

## 2020-09-23 PROCEDURE — 97110 THERAPEUTIC EXERCISES: CPT

## 2020-09-23 PROCEDURE — 97140 MANUAL THERAPY 1/> REGIONS: CPT

## 2020-09-23 NOTE — PROGRESS NOTES
PT DAILY TREATMENT NOTE     Patient Name: Nirmal Keyes  Date:2020  : 1945  [x]  Patient  Verified  Payor: VA MEDICARE / Plan: VA MEDICARE PART A & B / Product Type: Medicare /    In time:1250  Out time:1405  Total Treatment Time (min): 75  Billed time (min): 55  1:1 Treatment Time (min): 55       Diagnosis/ Reason for Treatment: Pain in left knee [M25.562]    SUBJECTIVE  Pain Level In(0-10 scale): 7/10    Any medication changes, allergies to medications, adverse drug reactions, diagnosis change, or new procedure performed?: [x] No    [] Yes (see summary sheet for update)    Subjective:  Patient has been frustrated since having been admitted to ICU. States he has not been able to do the things he is used to doing, and states he feels his mental health has suffered as a result. Patient reports pain has been notable, stating he had been doing better yesterday, but reports a recent re exacerbation due to wearing a night extension splint ordered by orthopedic PA. Patient reports having trouble sleeping since surgery, and reports concern over his heart, having had A-fib that got him admitted to ICU.      Medicare/BCBS Only   Total Timed Codes (min):  55 1:1 Treatment Time:  55     OBJECTIVE  Modality rationale: decrease edema, decrease inflammation, decrease pain and increase tissue extensibility to improve the patients ability to tolerate PT session and    Min Type Additional Details    [] Estim: []Att   []Unatt  []TENS instruct                 []IFC  []Premod []NMES                       []Other:  []w/US   []w/ice   []w/heat  Position:  Location:    []  Traction: [] Cervical       []Lumbar                       [] Prone          []Supine                       []Intermittent   []Continuous Lbs:  [] before manual  [] after manual    []  Ultrasound: []Continuous   [] Pulsed                           []1MHz   []3MHz Location:  W/cm2:    []  Iontophoresis with dexamethasone         Location: [] Take home patch   [] In clinic   10 []  Ice     [x]  heat  []  Ice massage Position: Seated   Location: L knee   10 [x]  Vasopneumatic Device Pressure: [x] lo [] med [] hi   Temp: [x] lo [] med [] hi   [x] Skin assessment post-treatment:  [x]intact []redness- no adverse reaction       []redness  adverse reaction:       42 min Therapeutic Exercise:  [] See flow sheet :   Rationale: increase ROM, increase strength and improve balance to improve the patients ability to return to normal daily activities at [facility]. min Therapeutic Activity:  []  See flow sheet :   Rationale:       min Neuromuscular Re-education:  []  See flow sheet :   Rationale:     13 min Manual Therapy:  STM/ deep tissue massage HS tendons B, anterior medial knee at joint line. Patellar mobs, scar massage via cross friction massage. Rationale: decrease pain, increase ROM, increase tissue extensibility and help with scar tissue remodling to allow patient to complete exercises with less pain and to help increase extensibility to get more from stretching and improve ROM. min Gait Training:  ___ feet with ___ device on level surfaces with ___ level of assist   Rationale:           min Patient Education: [x] Review HEP    [] Progressed/Changed HEP based on:   [] positioning   [] body mechanics   [] transfers   [] heat/ice application        Pain Level Out(0-10 scale): 4/10    Patient response to today's treatment: Fatigue and dizziness reported end of session. Patient reports some pain with exercises but able to tolerate. Decrease in pain end of session following vaso. Functional Assessment: Continued with PT per L TKA goals, working to restore ROM, strength, and function. Psychosocial aspect of patient care addressed this visit, discussing current patient symptoms and concerns about psychological aspects of care at this time.  Patient advised to return to walking for exercise and to ween into use of knee extension brace in an attempt to help mental help and help with pain control. Patient progressed to supine HS stretching, and encouraged to continue HEP. STM added this visit to address pain and soft tissue adhesions limiting ROM. Vaso end of session for edema and pain control. Patient will continue to benefit from skilled PT services, to modify and progress therapeutic interventions, address functional mobility deficits, address ROM deficits, address strength deficits, analyze and address soft tissue restrictions and analyze and cue movement patterns, in order to continue to make progress toward remaining PT goals. []  See Plan of Care  []  See progress note/recertification  []  See Discharge Summary         Progress towards goals / Updated goals:  Patient making progress toward goals. AAROM measured -5-122 deg today.      PLAN  [x]  Upgrade activities as tolerated     [x]  Continue plan of care  []  Update interventions per flow sheet       []  Discharge due to:_  []  Other:_      Adelaide Valencia DPT 9/23/2020  8:07 AM

## 2020-09-25 ENCOUNTER — HOSPITAL ENCOUNTER (OUTPATIENT)
Dept: PHYSICAL THERAPY | Age: 75
Discharge: HOME | End: 2020-09-25
Payer: MEDICARE

## 2020-09-25 PROCEDURE — 97016 VASOPNEUMATIC DEVICE THERAPY: CPT

## 2020-09-25 PROCEDURE — 97110 THERAPEUTIC EXERCISES: CPT

## 2020-09-25 PROCEDURE — 97140 MANUAL THERAPY 1/> REGIONS: CPT

## 2020-09-25 NOTE — PROGRESS NOTES
PT DAILY TREATMENT NOTE 8    Patient Name: Aziza Bobbi  Date:2020  : 1945  [x]  Patient  Verified  Payor: [place] MEDICARE / Plan: [place] MEDICARE PART A & B / Product Type: Medicare /    In time:1302  Out time:1355  Total Treatment Time (min): 53  Total Timed Codes (min): 43    Treatment Area: Pain in left knee [M25.562]    SUBJECTIVE  Pain Level (0-10 scale): 4/10  Any medication changes, allergies to medications, adverse drug reactions, diagnosis change, or new procedure performed?: [x] No    [] Yes (see summary sheet for update)  Subjective functional status/changes:   [] No changes reported  Pt re[prted that he has decided he is going to do what he wants, and that maybe it has made him feel better overall. Has been concerned about afib issues buts tates he is feeling better from that event as well. OBJECTIVE  Modality rationale: decrease pain and increase tissue extensibility to improve the patients ability to return ti prior level of function.    Min Type Additional Details    [] Estim: []Att   []Unatt  []TENS instruct                 []IFC  []Premod []NMES                       []Other:  []w/US   []w/ice   []w/heat  Position:  Location:    []  Traction: [] Cervical       []Lumbar                       [] Prone          []Supine                       []Intermittent   []Continuous Lbs:  [] before manual  [] after manual    []  Ultrasound: []Continuous   [] Pulsed                           []1MHz   []3MHz Location:  W/cm2:    []  Iontophoresis with dexamethasone         Location: [] Take home patch   [] In clinic   10 []  Ice     [x]  heat  []  Ice massage Position:longsitting  Location:L knee   10 [x]  Vasopneumatic Device Pressure: [x] lo [] med [] hi   Temp: [] lo [] med [] hi   [] Skin assessment post-treatment:  []intact []redness- no adverse reaction       []redness  adverse reaction:       20 min Therapeutic Exercise:  [x] See flow sheet :   Rationale: increase ROM, increase strength, improve coordination and improve balance to improve the patients ability to     13 min Manual Therapy:  IASTM hamstrings and patellar mobs   Rationale: increase ROM and increase tissue extensibility to improve ROM/decrease pain             min Patient Education: [x] Review HEP    [] Progressed/Changed HEP based on:   [] positioning   [] body mechanics   [] transfers   [] heat/ice application           Pain Level (0-10 scale) post treatment: 4/10    ASSESSMENT/Changes in Function: Pt appears to be moving better today, IASTM to hamstrings and quads, and patellar mobs. Patient will continue to benefit from skilled PT services to address ROM deficits and address strength deficits to attain remaining goals.      [x]  See Plan of Care  []  See progress note/recertification  []  See Discharge Summary         PLAN  []  Upgrade activities as tolerated     [x]  Continue plan of care  []  Update interventions per flow sheet       []  Discharge due to:_  []  Other:_      ADDI Jeff  9/25/2020  1:13 PM

## 2020-09-30 ENCOUNTER — HOSPITAL ENCOUNTER (OUTPATIENT)
Dept: PHYSICAL THERAPY | Age: 75
Discharge: HOME | End: 2020-09-30
Payer: MEDICARE

## 2020-09-30 PROCEDURE — 97016 VASOPNEUMATIC DEVICE THERAPY: CPT

## 2020-09-30 PROCEDURE — 97110 THERAPEUTIC EXERCISES: CPT

## 2020-09-30 NOTE — PROGRESS NOTES
PT DAILY TREATMENT NOTE     Patient Name: Oanh Cover  Date:2020  : 1945  [x]  Patient  Verified  Payor: [place] MEDICARE / Plan: [place] MEDICARE PART A & B / Product Type: Medicare /    In time:1017  Out time:1127  Total Treatment Time (min): 70  Total Timed Codes (min): 50    Visit #: 16    Treatment Area: Pain in left knee [M25.562]    SUBJECTIVE  Pain Level (0-10 scale): 6/10  Any medication changes, allergies to medications, adverse drug reactions, diagnosis change, or new procedure performed?: [x] No    [] Yes (see summary sheet for update)  Subjective functional status/changes:   [] No changes reported  Pt reported that he was doing fair today. Into a few minuted of standing exercises, pt reported dizziness and took a seated rest break. Stated that it eased up after sitting for a few minutes. Rest of session was performed long sitting. OBJECTIVE  Modality rationale: decrease pain, increase tissue extensibility and increase muscle contraction/control to improve the patients ability to return to normal function.    Min Type Additional Details    [] Estim: []Att   []Unatt  []TENS instruct                 []IFC  []Premod []NMES                       []Other:  []w/US   []w/ice   []w/heat  Position:  Location:    []  Traction: [] Cervical       []Lumbar                       [] Prone          []Supine                       []Intermittent   []Continuous Lbs:  [] before manual  [] after manual    []  Ultrasound: []Continuous   [] Pulsed                           []1MHz   []3MHz Location:  W/cm2:    []  Iontophoresis with dexamethasone         Location: [] Take home patch   [] In clinic   10 []  Ice     [x]  heat  []  Ice massage Position:seated  Location: L knee   10 [x]  Vasopneumatic Device Pressure: [x] lo [] med [] hi   Temp: [] lo [] med [] hi   [x] Skin assessment post-treatment: intact [x]redness- no adverse reaction       []redness  adverse reaction:       50 min Therapeutic Exercise:  [x] See flow sheet :   Rationale: increase ROM, increase strength and increase proprioception to improve the patients ability to ambulate without AD and perform daily activities pain free. min Patient Education: [x] Review HEP    [] Progressed/Changed HEP based on:   [] positioning   [] body mechanics   [] transfers   [] heat/ice application          Pain Level (0-10 scale) post treatment: 4/10    ASSESSMENT/Changes in Function: Added hip 3 way in standing today to promote strengthening w 2#, and other exercises per flow sheet. After pt reported  A dizzy spell while standing, cont w exercises in long sitting position. Patient will continue to benefit from skilled PT services to address functional mobility deficits, address ROM deficits and address strength deficits to attain remaining goals.      [x]  See Plan of Care  []  See progress note/recertification  []  See Discharge Summary             PLAN  []  Upgrade activities as tolerated     [x]  Continue plan of care  []  Update interventions per flow sheet       []  Discharge due to:_  []  Other:_      ADDI Lewis  9/30/2020  12:16 PM

## 2020-10-02 ENCOUNTER — HOSPITAL ENCOUNTER (OUTPATIENT)
Dept: PHYSICAL THERAPY | Age: 75
Discharge: HOME | End: 2020-10-02
Payer: MEDICARE

## 2020-10-02 PROCEDURE — 97016 VASOPNEUMATIC DEVICE THERAPY: CPT

## 2020-10-02 PROCEDURE — 97110 THERAPEUTIC EXERCISES: CPT

## 2020-10-02 NOTE — PROGRESS NOTES
PT DAILY TREATMENT NOTE     Patient Name: Laurence Kate  Date:10/2/2020  : 1945  [x]  Patient  Verified  Payor: Millicent Mccracken / Plan: VA MEDICARE PART A & B / Product Type: Medicare /    In ZIFH:1559HAD time:1536  Total Treatment Time (min): 58  Total Timed Codes (min): 48    Visit #: 17    Treatment Area: Pain in left knee [M25.562]    SUBJECTIVE  Pain Level (0-10 scale): 5/10  Any medication changes, allergies to medications, adverse drug reactions, diagnosis change, or new procedure performed?: [x] No    [] Yes (see summary sheet for update)  Subjective functional status/changes:   [] No changes reported  Pt states the \"ususal\" pain today     OBJECTIVE  Modality rationale: decrease pain to improve the patients ability to return to a normal gait pattern to con't with his daily exercise regimen. 10 []  Ice     [x]  heat  []  Ice massage Position:seated  Location:L knee/R knee   10 [x]  Vasopneumatic Device Pressure: [] lo [] med [] hi   Temp: [x] lo [] med [] hi   [x] Skin assessment post-treatment:  [x]intact [x]redness- no adverse reaction       []redness  adverse reaction:     38 min Therapeutic Exercise:  [x] See flow sheet :   Rationale: increase ROM, increase strength and improve balance to improve the patients ability to return to PLOF. min Patient Education: [x] Review HEP    [] Progressed/Changed HEP based on:   [] positioning   [] body mechanics   [] transfers   [] heat/ice application        Other Objective/Functional Measures:Held some exercises today secondary ti B knee pain. Pain Level (0-10 scale) post treatment: 5/10    ASSESSMENT/Changes in Function: Pt making progress but everyday is a little different upon pt arrival. Pt feels his progress has been hindered by his current Afib situation that he was hospitalized for right after his knee surgery.      Patient will continue to benefit from skilled PT services to address ROM deficits, address strength deficits and analyze and modify body mechanics/ergonomics to attain remaining goals.      [x]  See Plan of Care  []  See progress note/recertification  []  See Discharge Summary           PLAN  []  Upgrade activities as tolerated     [x]  Continue plan of care  []  Update interventions per flow sheet       []  Discharge due to:_  []  Other:_      ADDI March  10/2/2020  3:09 PM

## 2020-10-05 ENCOUNTER — HOSPITAL ENCOUNTER (OUTPATIENT)
Dept: PHYSICAL THERAPY | Age: 75
Discharge: HOME | End: 2020-10-05
Payer: MEDICARE

## 2020-10-05 PROCEDURE — 97016 VASOPNEUMATIC DEVICE THERAPY: CPT

## 2020-10-05 PROCEDURE — 97110 THERAPEUTIC EXERCISES: CPT

## 2020-10-05 NOTE — PROGRESS NOTES
PT DAILY TREATMENT NOTE     Patient Name: Adriana Fang  Date:10/5/2020  : 1945  [x]  Patient  Verified  Payor: VA MEDICARE / Plan: VA MEDICARE PART A & B / Product Type: Medicare /    In time:1304  Out time:1406  Total Treatment Time (min): 62  Total Timed Codes (min): 42    Visit #: 18    Treatment Area: Pain in left knee [M25.562]    SUBJECTIVE  Pain Level (0-10 scale): 7/10  Any medication changes, allergies to medications, adverse drug reactions, diagnosis change, or new procedure performed?: [x] No    [] Yes (see summary sheet for update)  Subjective functional status/changes:   [] No changes reported  Pt reported that he is concerned about a knot like sensitive area on his lateral knee. OBJECTIVE  Modality rationale: decrease pain and increase tissue extensibility to improve the patients ability to walk distance without pain. 10 []  Ice     [x]  heat  []  Ice massage Position:seated  Location:knee   10 []  Vasopneumatic Device Pressure: [x] lo [] med [] hi   Temp: [x] lo [] med [] hi   [x] Skin assessment post-treatment:  [x]intact [x]redness- no adverse reaction       []redness  adverse reaction:       42 min Therapeutic Exercise:  [x] See flow sheet :   Rationale: increase strength, improve coordination and improve balance to improve the patients ability to ambulate without pain. min Patient Education: [x] Review HEP    [] Progressed/Changed HEP based on:   [] positioning   [] body mechanics   [] transfers   [] heat/ice application        Other Objective/Functional Measures: Performed exercises noted on flow sheet, reviewed with pt benefits and actions required to perform desensitizing knee. Pain Level (0-10 scale) post treatment: 6/10    ASSESSMENT/Changes in Function: Pt making gains with strength and endurance, states he is able to walk 3-4 blocks before knee then \"blows up\".     Patient will continue to benefit from skilled PT services to address functional mobility deficits, address ROM deficits, address strength deficits and analyze and cue movement patterns to attain remaining goals.      [x]  See Plan of Care  []  See progress note/recertification  []  See Discharge Summary         PLAN  []  Upgrade activities as tolerated     [x]  Continue plan of care  []  Update interventions per flow sheet       []  Discharge due to:_  []  Other:_      ADDI Carlton  10/5/2020  2:14 PM

## 2020-10-08 ENCOUNTER — OFFICE VISIT (OUTPATIENT)
Dept: ORTHOPEDIC SURGERY | Age: 75
End: 2020-10-08
Payer: MEDICARE

## 2020-10-08 DIAGNOSIS — M17.12 PRIMARY OSTEOARTHRITIS OF LEFT KNEE: Primary | ICD-10-CM

## 2020-10-08 PROCEDURE — 99024 POSTOP FOLLOW-UP VISIT: CPT | Performed by: ORTHOPAEDIC SURGERY

## 2020-10-08 NOTE — PATIENT INSTRUCTIONS
Knee Pain or Injury: Care Instructions  Your Care Instructions     Injuries are a common cause of knee problems. Sudden (acute) injuries may be caused by a direct blow to the knee. They can also be caused by abnormal twisting, bending, or falling on the knee. Pain, bruising, or swelling may be severe, and may start within minutes of the injury. Overuse is another cause of knee pain. Other causes are climbing stairs, kneeling, and other activities that use the knee. Everyday wear and tear, especially as you get older, also can cause knee pain. Rest, along with home treatment, often relieves pain and allows your knee to heal. If you have a serious knee injury, you may need tests and treatment. Follow-up care is a key part of your treatment and safety. Be sure to make and go to all appointments, and call your doctor if you are having problems. It's also a good idea to know your test results and keep a list of the medicines you take. How can you care for yourself at home? · Be safe with medicines. Read and follow all instructions on the label. ? If the doctor gave you a prescription medicine for pain, take it as prescribed. ? If you are not taking a prescription pain medicine, ask your doctor if you can take an over-the-counter medicine. · Rest and protect your knee. Take a break from any activity that may cause pain. · Put ice or a cold pack on your knee for 10 to 20 minutes at a time. Put a thin cloth between the ice and your skin. · Prop up a sore knee on a pillow when you ice it or anytime you sit or lie down for the next 3 days. Try to keep it above the level of your heart. This will help reduce swelling. · If your knee is not swollen, you can put moist heat, a heating pad, or a warm cloth on your knee. · If your doctor recommends an elastic bandage, sleeve, or other type of support for your knee, wear it as directed.   · Follow your doctor's instructions about how much weight you can put on your leg. Use a cane, crutches, or a walker as instructed. · Follow your doctor's instructions about activity during your healing process. If you can do mild exercise, slowly increase your activity. · Reach and stay at a healthy weight. Extra weight can strain the joints, especially the knees and hips, and make the pain worse. Losing even a few pounds may help. When should you call for help? Call 911 anytime you think you may need emergency care. For example, call if:    · You have symptoms of a blood clot in your lung (called a pulmonary embolism). These may include:  ? Sudden chest pain. ? Trouble breathing. ? Coughing up blood. Call your doctor now or seek immediate medical care if:    · You have severe or increasing pain.     · Your leg or foot turns cold or changes color.     · You cannot stand or put weight on your knee.     · Your knee looks twisted or bent out of shape.     · You cannot move your knee.     · You have signs of infection, such as:  ? Increased pain, swelling, warmth, or redness. ? Red streaks leading from the knee. ? Pus draining from a place on your knee. ? A fever.     · You have signs of a blood clot in your leg (called a deep vein thrombosis), such as:  ? Pain in your calf, back of the knee, thigh, or groin. ? Redness and swelling in your leg or groin. Watch closely for changes in your health, and be sure to contact your doctor if:    · You have tingling, weakness, or numbness in your knee.     · You have any new symptoms, such as swelling.     · You have bruises from a knee injury that last longer than 2 weeks.     · You do not get better as expected. Where can you learn more? Go to http://www.gray.com/  Enter K195 in the search box to learn more about \"Knee Pain or Injury: Care Instructions. \"  Current as of: June 26, 2019               Content Version: 12.6  © 0600-0100 [employer].    Care instructions adapted under license by [employer] (which disclaims liability or warranty for this information). If you have questions about a medical condition or this instruction, always ask your healthcare professional. [address] any warranty or liability for your use of this information.

## 2020-10-08 NOTE — PROGRESS NOTES
Name: Devang Keys    : 1945     Service Dept: [address] [place] [address] and Sports Medicine    Patient's Pharmacies:    [facility] [address] [facility] [address] [place]  [address] [place] [address]  [address] [id number]  Phone: [phone] Fax: [phone]       Chief Complaint   Patient presents with    Surgical Follow-up        There were no vitals taken for this visit. Allergies   Allergen Reactions    Cefuroxime Axetil Anaphylaxis    Cephalexin Other (comments)    Eszopiclone Other (comments)     Cannot take generic version of Lunesta  Cannot take generic version of Lunesta          Current Outpatient Medications   Medication Sig Dispense Refill    amLODIPine (NORVASC) 10 mg tablet Take 1 Tab by mouth daily.  ALPRAZolam (XANAX) 0.25 mg tablet alprazolam 0.25 mg tablet   TAKE 1 TABLET BY MOUTH TWICE DAILY AS NEEDED FOR SEVERE ANXIETY AND PANIC AS DIRECTED      acetaminophen-codeine (TYLENOL #3) 300-30 mg per tablet acetaminophen 300 mg-codeine 30 mg tablet      busPIRone (BUSPAR) 15 mg tablet Take 15 mg by mouth two (2) times a day.  desmopressin (Nocdurna, men,) 55.3 mcg TbDi 55.3 mcg by SubLINGual route.  eszopiclone (LUNESTA) 3 mg tablet Take 3 mg by mouth.  gabapentin (NEURONTIN) 300 mg capsule Take 900 mg by mouth.  HYDROcodone-acetaminophen (Norco) 7.5-325 mg per tablet Norco 7.5 mg-325 mg tablet   Take 1 tablet 3 times a day by oral route as needed for 20 days.  meloxicam (MOBIC) 15 mg tablet Take 15 mg by mouth daily.  metoprolol tartrate (LOPRESSOR) 25 mg tablet       naloxone (Narcan) 4 mg/actuation nasal spray Narcan 4 mg/actuation nasal spray      sertraline (ZOLOFT) 50 mg tablet Take 50 mg by mouth daily.  tadalafiL (CIALIS) 5 mg tablet Take 5 mg by mouth daily. There is no problem list on file for this patient.        Family History   Problem Relation Age of Onset    No Known Problems Mother     No Known Problems Father         Social History     Socioeconomic History    Marital status:      Spouse name: Not on file    Number of children: Not on file    Years of education: Not on file    Highest education level: Not on file   Tobacco Use    Smoking status: Former Smoker    Smokeless tobacco: Never Used   Substance and Sexual Activity    Alcohol use: Never     Frequency: Never        History reviewed. No pertinent surgical history. Past Medical History:   Diagnosis Date    Arthritis     Hypertension         I have reviewed and agree with PFSH and ROS and intake form in chart and the record. Review of Systems:   Patient is a pleasant appearing individual, appropriately dressed, well hydrated, well nourished, who is alert, appropriately oriented for age, and in no acute distress with a normal gait and normal affect who does not appear to be in any significant pain. Physical Exam:  Left knee - Neurovascularly intact with good cap refill, full range of motion and full strength, well healed incision noted, no swelling, no erythema, no instability. Right knee - Decrease range of motion with flexion, Some crepitation, Grossly neurovascularly intact, Good cap refill, No skin lesion, Moderate swelling, No gross instability, Some quadriceps weakness         Encounter Diagnoses     ICD-10-CM ICD-9-CM   1. Primary osteoarthritis of left knee  M17.12 715.16          HPI:  The patient is here with a chief complaint of left knee pain, status post left total knee replacement, doing well. He is still having some soreness. He had atrial fibrillation and was readmitted for that. X-rays of the left knee today in our office, AP, lateral and obliques are unremarkable with no evidence of any displacement or hardware failure. Assessment/Plan:  1. Left total knee replacement, doing well, just a little bit of soreness. Plan at this point, activities as tolerated started, yearly appointment.   We will see him back, and if he gets worse, he is to give me a call, and we will go from there. Return to Office: Follow-up and Dispositions    · Return in about 1 year (around 10/8/2021). Scribed by Timothy Leal as dictated by Thor Rowe. Krzysztof Ward MD.    Documentation True and Accepted Leroy Ward MD

## 2020-10-09 ENCOUNTER — HOSPITAL ENCOUNTER (OUTPATIENT)
Dept: PHYSICAL THERAPY | Age: 75
Discharge: HOME | End: 2020-10-09
Payer: MEDICARE

## 2020-10-09 PROCEDURE — 97110 THERAPEUTIC EXERCISES: CPT

## 2020-10-09 PROCEDURE — 97016 VASOPNEUMATIC DEVICE THERAPY: CPT

## 2020-10-09 NOTE — PROGRESS NOTES
[address] [facility], [address] [place] [address]  Phone: [phone]    Fax: [phone]   Progress Note/CONTINUED PLAN OF CARE for PHYSICAL THERAPY          Patient Name: Janeth Lim : 1945   Treatment/Medical Diagnosis: Pain in left knee [M25.562]   Onset Date: 2020    Referral Source: Jorden Beebe MD Start of Care [facility]): 2020   Prior Hospitalization: See Medical History Provider #: [id number]   Prior Level of Function: independent   PMH: HTN, Arthritis, Lumbar fusion, RTC repair (2019)   Medications: Verified on Patient Summary List       Objective Measures:                                                  AROM                      PROM                   Strength (1-5)      Left Right Left Right Left Right   Hip Flexion [place] 4+/5 WFL                WFL   Knee Flexion 105  WFL 4+/5 WFL     Extension -5 WFL 0 WFL 4+/5 WFL          Short Term Goals:  1. Pt will be independent with HEP to improve L knee range of motion and strength in 3 weeks. MET (2020)     2. Pt will demonstrate L knee PROM 0-120 degrees for improved ADL efficiency in 3 weeks. MET.     3. Pt will report no greater than 6/10 pain in L knee with daily activity in 3 weeks. MET. Long Term Goals:  1. Pt will improve L knee strength to 5/5 for improved ability to rise from seated position without difficulty in 6 weeks. Progressing, knee strength 4+/5 today and pt reports that rising from seated position proves to be most difficult at this time.      2. Pt will improve L knee AROM to 0-120 degrees for improved ability to climb steps in/out of home in 6 weeks. Progressing, AROM: -5-105 degrees. Step negotiation in/out of home independent per report and as observed in clinic.      3. Pt will report pain-free L knee mobility for ability to return to hobbies such as walking for exercise in 6 weeks.  Progressing, pt reports 5/10 pain in L knee today, palpable tenderness most noted along medial patellar border. Pt has been able to increased distance for walking. Key Functional Changes/Progress: Pt is 7 weeks s/p L TKA and has made measurable progress progress with ROM and strength even in presence of brief hospitalization secondary to atrial fibrillation. Pain proves to be persistent and limiting to quality of life & mobility including transfers and walking for distance. Pt also admits to cognitive changes s/p hospitalization, consistent with observations made in clinic such as forgetting appointment times, poor verbal recall and trouble with word finding or counting. Plan to obtain referral for SLP for cognitive assessment per agreement with pt & PCP (via phone call today). Problem List: pain affecting function, decrease ROM, decrease strength, impaired gait/ balance, decrease ADL/ functional abilitiies and decrease activity tolerance     Updated Plan of Care:    Treatment Plan to include the following per provider discretion: Therapeutic exercise, Neuromuscular re-education, Physical agent/modality, Gait/balance training, Manual therapy, Patient education, Self Care training and Functional mobility training    Frequency / Duration:  Patient to be seen   2-3   times per week for   4-6  weeks      Discharge planning: will provide progressive HEP upon discharge. If you have any questions/comments please contact us directly at [phone]. Thank you for allowing us to assist in the care of your patient. Therapist Signature: Prince Villanueva PT, DPT Date: 67/3/6131   Certification Period:  Reporting Period: 10/9/2020 -12/28/2020 8/18/2020 - 10/9/2020 Time: 1:57 PM   NOTE TO PHYSICIAN:  PLEASE COMPLETE THE ORDERS BELOW AND FAX TO   [facility]: [phone].   If you are unable to process this request in 24 hours please contact our office: [phone].    ___ I have read the above report and request that my patient continue as recommended.   ___ I have read the above report and request that my patient continue therapy with the following changes/special instructions: ________________________________________________   ___ I have read the above report and request that my patient be discharged from therapy.      Physician Signature:        Date:       Time:

## 2020-10-09 NOTE — PROGRESS NOTES
PT DAILY TREATMENT NOTE     Patient Name: Abiola Geronimo  Date:10/9/2020  : 1945  [x]  Patient  Verified  Payor: Sheryl Brown / Plan: [place] MED[place] & B / Product Type: Medicare /    In time:1338  Out time:1435  Total Treatment Time (min): 57  Total Timed Codes (min): 47  1:1 Treatment Time (min): 47       Treatment Area: Pain in left knee [M25.562]    SUBJECTIVE  Pt reports consistent L knee pain, states that surgeon follow-up went well, has another follow-up in . Pt also alludes to some difficulties with word finding during that visit with MD and difficulty when following HEP on his iPad. Pt attributes changes in mental capacity to his hospitalization following TKA. Pt is agreeable to pursue cognitive therapy with in house SLP.    Pain Level (0-10 scale): 5/10  Any medication changes, allergies to medications, adverse drug reactions, diagnosis change, or new procedure performed?: [x] No    [] Yes (see summary sheet for update)        OBJECTIVE  Modality rationale: decrease inflammation and decrease pain   Min Type Additional Details    [] Estim: []Att   []Unatt  []TENS instruct                 []IFC  []Premod []NMES                       []Other:  []w/US   []w/ice   []w/heat  Position:  Location:    []  Traction: [] Cervical       []Lumbar                       [] Prone          []Supine                       []Intermittent   []Continuous Lbs:  [] before manual  [] after manual    []  Ultrasound: []Continuous   [] Pulsed                           []1MHz   []3MHz Location:  W/cm2:         []  Ice     []  heat  []  Ice massage Position:  Location:   10 [x]  Vasopneumatic Device Pressure: [x] lo [] med [] hi   Temp: [x] lo [] med [] hi         47 min Therapeutic Exercise:  [x] See flow sheet :   Rationale: increase ROM, increase strength, improve balance and increase proprioception to improve the patients ability to maximize pain-free daily activity & restore baseline function             With TE Patient Education: [x] Review HEP    [] Progressed/Changed HEP based on:   [] positioning   [] body mechanics   [] transfers   [] heat/ice application          Pain Level (0-10 scale) post treatment: 5/10    ASSESSMENT/Changes in Function: Pt seen today for reassessment of progress towards functional & objective goals.         []  See Plan of Care  [x]  See progress note/recertification  []  See Discharge Summary             PLAN  []  Upgrade activities as tolerated     []  Continue plan of care  [x]  Update interventions per flow sheet       []  Discharge due to:_  [x]  Other: phone call made to PCP office, spoke with Dr. [facility] nurse in regards to Rx for cognitive assessment & treatment with plans to fax over order verbalized on phone call    Isabel Vazquez PT, DPT 10/9/2020  1:38 PM

## 2020-10-12 ENCOUNTER — HOSPITAL ENCOUNTER (OUTPATIENT)
Dept: PHYSICAL THERAPY | Age: 75
Discharge: HOME | End: 2020-10-12
Payer: MEDICARE

## 2020-10-12 ENCOUNTER — APPOINTMENT (OUTPATIENT)
Dept: PHYSICAL THERAPY | Age: 75
End: 2020-10-12
Payer: MEDICARE

## 2020-10-12 PROCEDURE — 97016 VASOPNEUMATIC DEVICE THERAPY: CPT

## 2020-10-12 PROCEDURE — 97110 THERAPEUTIC EXERCISES: CPT

## 2020-10-12 NOTE — PROGRESS NOTES
PT DAILY TREATMENT NOTE 8    Patient Name: Luis Angel Shook  Date:10/12/2020  : 1945  [x]  Patient  Verified  Payor: [place] MEDICARE / Plan: [place] MEDICARE PART A & B / Product Type: Medicare /    In AUEX:1562  Out time:1244  Total Treatment Time (min): 62  Total Timed Codes (min): 52  Visit #: 20    Treatment Area: Pain in left knee [M25.562]    SUBJECTIVE  Pain Level (0-10 scale): 5  Any medication changes, allergies to medications, adverse drug reactions, diagnosis change, or new procedure performed?: [x] No    [] Yes (see summary sheet for update)  Subjective functional status/changes:   [] No changes reported  Pt. Reported minor pain and stiffness in L knee. OBJECTIVE  Modality rationale: decrease inflammation, decrease pain and increase tissue extensibility to improve the patients ability to complete rehab related activities pain free.     Min Type Additional Details    [] Estim: []Att   []Unatt  []TENS instruct                 []IFC  []Premod []NMES                       []Other:  []w/US   []w/ice   []w/heat  Position:  Location:    []  Traction: [] Cervical       []Lumbar                       [] Prone          []Supine                       []Intermittent   []Continuous Lbs:  [] before manual  [] after manual    []  Ultrasound: []Continuous   [] Pulsed                           []1MHz   []3MHz Location:  W/cm2:    []  Iontophoresis with dexamethasone         Location: [] Take home patch   [] In clinic    []  Ice     []  heat  []  Ice massage Position:  Location:   10 [x]  Vasopneumatic Device Pressure: [] lo [x] med [] hi   Temp: [] lo [x] med [] hi   [] Skin assessment post-treatment:  []intact []redness- no adverse reaction       []redness  adverse reaction:       40 min Therapeutic Exercise:  [x] See flow sheet :   Rationale: increase ROM, increase strength and improve balance to improve the patients ability for step negotiation, increase quad strength allowing more efficiency with sit-to-stand, and allow pain free ability. min Patient Education: [x] Review HEP    [] Progressed/Changed HEP based on:   [] positioning   [] body mechanics   [] transfers   [] heat/ice application        Pain Level (0-10 scale) post treatment: 0    ASSESSMENT/Changes in Function: Exercises completed per flow sheet with extended time given for self stretches and static balance exercises. Pt. Reminded of proper form throughout session with step ups and resisted side stepping. Patient will continue to benefit from skilled PT services to modify and progress therapeutic interventions, address functional mobility deficits, address ROM deficits, address strength deficits and analyze and address soft tissue restrictions to attain remaining goals.      [x]  See Plan of Care  []  See progress note/recertification  []  See Discharge Summary         PLAN  []  Upgrade activities as tolerated     [x]  Continue plan of care  []  Update interventions per flow sheet       []  Discharge due to:_  []  Other:_      ADDI Kathleen  10/12/2020  2:33 PM

## 2020-10-14 ENCOUNTER — HOSPITAL ENCOUNTER (OUTPATIENT)
Dept: PHYSICAL THERAPY | Age: 75
Discharge: HOME | End: 2020-10-14
Payer: MEDICARE

## 2020-10-14 PROCEDURE — 92507 TX SP LANG VOICE COMM INDIV: CPT

## 2020-10-14 PROCEDURE — 92523 SPEECH SOUND LANG COMPREHEN: CPT

## 2020-10-14 PROCEDURE — 97110 THERAPEUTIC EXERCISES: CPT

## 2020-10-14 NOTE — PROGRESS NOTES
[facility] [place]  [address] [facility], [address] [facility] [address]  Ph: [phone]  Fax: [phone]    Plan of Care/ Statement of Necessity for Speech Therapy Services    Patient name: Laurence Kate Start of Care: 10/14/2020   Referral source: Earl Majano MD : 1945    Medical Diagnosis: Other symptoms and signs involving cognitive functions and awareness [R41.89]   Onset Date: 10/14/20   Treatment Diagnosis: R41.841   Prior Hospitalization: see medical history Provider#: [id number]   Medications: Verified on Patient summary List    Comorbidities: AFib   Prior Level of Function: Patient with no cognitive deficits prior to hospitalization for Afib following total knee replacement    The Plan of Care and following information is based on the information from the initial evaluation. Assessment/ key information: Patient referred by PT and PCP following noticeable cognitive decline during PT. Patient with recent TKR and subsequent hospitalization for Afib. Problem List:   Impaired Cognition    Treatment Plan may include any combination of the following: Cognitive/Language Treatment      Patient / Family readiness to learn indicated by: asking questions, trying to perform skills and interest    Persons(s) to be included in education:   patient (P)    Barriers to Learning/Limitations: None    Patient Goal (s): Patient stated that he wants to be able to return to performing activities of daily living without difficulty     Patient Self Reported Health Status: fair    Rehabilitation Potential: good    Short Term Goals: To be accomplished in 4 weeks  1. Complete divergent naming tasks with 80% accuracy, modA  2. Complete functional working memory tasks, utilizing compensatory internal/external techniques, with 80% accuracy, Andrea  3. Recall auditory information with 80% accuracy, Andrea  4. Recall 3-5 words after delay/distraction with 80% accuracy, Andrea  5.  Complete deductive reasoning/logic tasks with 80% accuracy, Andrea    Long Term Goals: To be accomplished in 12 weeks   Patient will improve cognitive function to resume activities of daily living with minimal assistance           Frequency / Duration: Patient to be seen 2-3 times per week for 12 weeks:        Patient/ Caregiver education and instruction: Diagnosis, prognosis, Compensatory Techniques,  Plan of care    Certification Period: 10/14/20-1/06/21    Abdias Ferrer MA, CCC-SLP    10/14/2020 5:18 PM  ______________________________________________________________________    I certify that the above Therapy Services are being furnished while the patient is under my care. I agree with the treatment plan and certify that this therapy is necessary.     Physician's Signature:____________________  Date:___________Time:_________    Please sign and return to [facility]   Ph: [phone] Fax: [phone]

## 2020-10-14 NOTE — PROGRESS NOTES
ST DAILY TREATMENT NOTE/. COGNITIVE EVAL    Patient Name: Luis Angel Shook  Date:10/14/2020  : 1945  [x]  Patient  Verified  Payor: [place] MEDICARE / Plan: [place] MEDICARE PART A & B / Product Type: Medicare /   In time:1440  Out time:1530  Total Treatment Time (min): 50      SUBJECTIVE  Pain Level (0-10 scale): 0  Any medication changes, allergies to medications, adverse drug reactions, diagnosis change, or new procedure performed?: [x] No    [] Yes (see summary sheet for update)    Subjective functional status/changes:   [] No changes reported  Patient reports a change in cognitive function since having his knee replaced and subsequent A Fib requiring hospitalization     Date of Onset: 20  Social History: Patient is a former  who lives with his wife   Prior Functional level: Patient denies any cognitive difficulty prior to knee replacement and hospitalization. Patient is familiar to this clinic and marked cognitive decline and change is affect have been observed by interdisciplinary team.   Radiology: Patient had MRI of the brain completed during hospitalization in 2020 showing no acute infarctions    OBJECTIVE  OUTPATIENT COGNITIVE-COMMUNICATION EVALUATION    OBJECTIVE  Receptive Language:  Receptive vocabulary    [x] WNL    [] Impaired [] Mild [] Mod [] Severe  Reliability of yes/no responses to questions [] WNL    [] Impaired [x] Mild [] Mod [] Severe Reliability of responses to complex ideation [] WNL    [] Impaired [x] Mild [x] Mod [] Severe  Auditory retention/processing   [] WNL    [] Impaired [] Mild [x] Mod [] Severe  Follow commands [x] 1 Step [] 2 Step [] 3 Step [] complex  Objective Information: Patient participated in conversation with ST with minimal difficulty.  Patient complete MOCA with minimal need for repetition of questions    Expressive Language  Automatic speech   [x] WNL    [] Impaired [] Mild [] Mod [] Severe  Able to identify objects/pictures  [x] WNL    [] Impaired [] Mild [] Mod [] Severe  Preservations    [x] WNL    [] Impaired [] Mild [] Mod [] Severe  Word retrieval    [] WNL    [] Impaired [] Mild [x] Mod [x] Severe  Paraphasias   [x]None[] Literal    [] Phenomic   [] Jargon   [] Semantic  Able to make needs known at level [] Word   [] Phrase   [x] Sentence   [] Gesture        [] Unable   Objective Information: Patient named 3 items in one minute when administered the [address] [place] [address]. Patient with frequent use of filler words such as \"uh and um\" when communicating    Cognition:  Attention:  [x] Alert    [] Drowsy  Orientation:  [x] Person   [x] Place    [x] Time  Attention to task: [x] Able to sustain for (min): >/= 30 minutes      [] Able to redirect with cues Min:    Mod:    Max:   Focused Attention:  Memory:  [] WNL    [x] Impaired ST   [] Impaired LT   Objective information: Patient with 20% accuracy when recalling words during [address] [place] [address] exam. Patient with increased difficulty repeating sentences of longer length. Patient with difficulty managing appointments     Executive Functions:  Neglect:  [x] None    [] Left   [] Right  Self-regulation  [x] Appropriate   [] Impulsive   [] Requires verbal cues  Awareness:  [] Poor initiation   [] Disinhibition   [] Constant supervision [x] WNL  Problem Solving: [] WNL    [x] Impaired [] Mild [x] Mod [x] Severe  Verbal Problem Solving: Patient with increased difficulty solving simple math problems when presented verbally.  Patient unable to complete this portion of the [address] [place] [address]  Situational Problem Solving: Patient required verbal cues   Abstract Language: Patient with 33% accuracy with abstract language portion of the [address] [place] [address]      Speech:  Oral Verbal Apraxia: [x] None    [] Mild    [] Moderate    [] Severe   Dysarthria:  [x] None    [] Mild    [] Moderate    [] Severe   Intelligibility:  [x] WNL    [] Words   [] Phrases   [] Sentences   [] Conversation      % Intelligible:    Writing: [] L or [] R [x] WNL    [] Impaired @ [] Word [] Sentence [] Paragraph    Reading:  [x] WNL    [] Impaired @ [] Word [] Sentence [] Paragraph        Patient/Caregiver instruction/education: [] Review HEP    [] Progressed/Changed    HEP/Handouts given: no handouts provided this session    Pain Level (0-10 scale) post treatment: 0    ASSESSMENT  [x]  See Plan of Care    Short Term Goals: To be accomplished in 4  weeks  1. Complete divergent naming tasks with 80% accuracy with modA  2. Complete functional working memory tasks, utilizing compensatory internal/external techniques, with 80% accuracy, Andrea  3. Recall auditory information with 80% accuracy, Andrea  4. Recall 3-5 words after delay/distraction with 80% accuracy, Andrea  5. Complete deductive reasoning/logic tasks with 80% accuracy, Andrea      Long Term Goals:  To be accomplished in 12 weeks   Patient will improve cognitive function to resume activities of daily living with minimal assistance    PLAN  [x]  Upgrade activities as tolerated     [x]  Continue plan of care  []  Discharge due to:__  [] Other:__    Evelin Khoury MA, [address] [facility]  10/14/2020  4:58 PM

## 2020-10-14 NOTE — PROGRESS NOTES
PT DAILY TREATMENT NOTE     Patient Name: Abiola Geronimo  Date:10/14/2020  : 1945  [x]  Patient  Verified  Payor: VA MEDICARE / Plan: VA MEDICARE PART A & B / Product Type: Medicare /    In time:1347 Out time:1438  Total Treatment Time (min): 61  Total Timed Codes (min): 61    Visit #: 21    Treatment Area: Pain in left knee [M25.562]    SUBJECTIVE  Pain Level (0-10 scale): 4/10  Any medication changes, allergies to medications, adverse drug reactions, diagnosis change, or new procedure performed?: [x] No    [] Yes (see summary sheet for update)  Subjective functional status/changes:   [] No changes reported  Pt reports that knee is tender when pressure applied today. Pt states tenderness. OBJECTIVE    61 min Therapeutic Exercise:  [x] See flow sheet :   Rationale: increase ROM, increase strength, improve coordination and improve balance to improve the patients ability to ambulate and perform daily activity pain free. Began session on bike for active warm up today, followed by stretches and strengtheing, and IASTM to lateral/medial knee, increased pain/tenderness with light pressure to knee cap.           min Patient Education: [x] Review HEP    [] Progressed/Changed HEP based on:   [] positioning   [] body mechanics   [] transfers   [] heat/ice application             Pain Level (0-10 scale) post treatment: 10    ASSESSMENT/Changes in Function:   Pt making slow progress but motivated. Appears to need VCs for technique on certain exercises. Patient will continue to benefit from skilled PT services to address functional mobility deficits, address ROM deficits and address strength deficits to attain remaining goals.      [x]  See Plan of Care  []  See progress note/recertification  []  See Discharge Summary             PLAN  []  Upgrade activities as tolerated     [x]  Continue plan of care  []  Update interventions per flow sheet       []  Discharge due to:_  []  Other:_      Everett Settle Tita Lisa  10/14/2020  4:04 PM

## 2020-10-16 ENCOUNTER — HOSPITAL ENCOUNTER (OUTPATIENT)
Dept: PHYSICAL THERAPY | Age: 75
Discharge: HOME | End: 2020-10-16
Payer: MEDICARE

## 2020-10-16 PROCEDURE — 97110 THERAPEUTIC EXERCISES: CPT

## 2020-10-16 PROCEDURE — 92507 TX SP LANG VOICE COMM INDIV: CPT

## 2020-10-16 NOTE — PROGRESS NOTES
PT DAILY TREATMENT NOTE     Patient Name: Doug Butcher  Date:10/16/2020  : 1945  [x]  Patient  Verified  Payor: VA MEDICARE / Plan: VA MEDICARE PART A & B / Product Type: Medicare /    In time:1058 Out time:1438  Total Treatment Time (min): 40  Total Timed Codes (min): 30    Visit #: 22    Treatment Area: Pain in left knee [M25.562]    SUBJECTIVE  Pain Level (0-10 scale): 10  Any medication changes, allergies to medications, adverse drug reactions, diagnosis change, or new procedure performed?: [x] No    [] Yes (see summary sheet for update)  Subjective functional status/changes:   [] No changes reported  Pt reports that he has been hurting all over since yesterday afternoon about 3pm, aches and pains. Suspects weather. OBJECTIVE  Modality rationale: decrease pain and increase tissue extensibility to improve the patients ability to ambulate safely.     Min Type Additional Details    [] Estim: []Att   []Unatt  []TENS instruct                 []IFC  []Premod []NMES                       []Other:  []w/US   []w/ice   []w/heat  Position:  Location:    []  Traction: [] Cervical       []Lumbar                       [] Prone          []Supine                       []Intermittent   []Continuous Lbs:  [] before manual  [] after manual    []  Ultrasound: []Continuous   [] Pulsed                           []1MHz   []3MHz Location:  W/cm2:    []  Iontophoresis with dexamethasone         Location: [] Take home patch   [] In clinic   10 []  Ice     [x]  heat  []  Ice massage Position:seated  Location:L knee in ext    []  Vasopneumatic Device Pressure: [] lo [] med [] hi   Temp: [] lo [] med [] hi   [] Skin assessment post-treatment:  []intact []redness- no adverse reaction       []redness  adverse reaction:       30 min Therapeutic Exercise:  [x] See flow sheet :   Rationale: increase ROM, increase strength, improve coordination and improve balance to improve the patients ability to return to normal strength and function. Began session today with MH to L knee in extension, followed by active warm up on LBE to improve joint mobility. Followed by stretching in // as pt reported that HSS was painful today. Pt then performed seated flexion, LAQ, and supine heel slides after a brief rest break and reported that he didn't think he could continue today as everything was hurting. Pt declined CP/Vaso as well.            min Patient Education: [x] Review HEP    [] Progressed/Changed HEP based on:   [] positioning   [] body mechanics   [] transfers   [] heat/ice application           Pain Level (0-10 scale) post treatment: 7/10    ASSESSMENT/Changes in Function: Increased paini post session today, pt requested to end treatment early. Slow progression but motivated. Patient will continue to benefit from skilled PT services to address functional mobility deficits, address ROM deficits and address strength deficits to attain remaining goals.      [x]  See Plan of Care  []  See progress note/recertification  []  See Discharge Summary             PLAN  []  Upgrade activities as tolerated     [x]  Continue plan of care  []  Update interventions per flow sheet       []  Discharge due to:_  []  Other:_      ADDI Hussein  10/16/2020  12:08 PM

## 2020-10-16 NOTE — PROGRESS NOTES
ST DAILY TREATMENT NOTE    Patient Name: Kaushik Yuen  Date:10/16/2020  : 1945  [x]  Patient  Verified  Payor: Payor: [place] MEDICARE / Plan: VA MEDICARE PART A & B / Product Type: Medicare /   In time:1005 Out time:1052  Total Treatment Time (min): 47      Treatment Diagnosis: Other symptoms and signs involving cognitive functions and awareness [R41.89]    SUBJECTIVE  Pain Level (0-10 scale): 5, Patient complains of arthritic pain in shoulder, back and knee    Any medication changes, allergies to medications, adverse drug reactions, diagnosis change, or new procedure performed?: [x] No    [] Yes (see summary sheet for update)    Subjective functional status/changes:   [x] No changes reported      OBJECTIVE  Treatment provided includes:  Increase/Improve:  []  Voice Quality [x]  Cognitive Linguistic Skills []  Laryngeal/Pharyngeal Exercises   []  Vocal Loudness []  Reading Comprehension []  Swallowing Skills    []  Vocal Cord Function []  Auditory Comprehension []  Oral Motor Skills   []  Resonance []  Writing Skills []  Compensatory strategies    []  Speech Intelligibility []  Expressive Language []  Attention   []  Breath Support/Coord. []  Receptive language [x]  Memory   []  Articulation []  Safety Awareness []    []  Fluency [x]  Word Retrieval []        Treatment Provided:  Patient participated in cognitive tasks targeting word finding, mental manipulation tasks, and verbal memory tasks. Patient required min-mod verbal cues to complete tasks.      Patient/Caregiver  Education: [x] Review HEP      HEP/Handouts given: Patient given activity to complete at home targeting mental manipulation    Pain Level (0-10 scale) post treatment: 5    ASSESSMENT   []   Improving appropriately and progressing toward goals  [x]   Improving slowly and progressing toward goals  []   Approximating goals/maximum potential  []   Continues to benefit from skilled therapy to address remaining functional deficits  []   Not progressing toward goals and plan of care will be adjusted    Patient will continue to benefit from skilled therapy to address remaining functional deficits: executive function, memory, recall, word finding, problem solving     Progress towards goals / Updated goals: Patient progressing towards goals        PLAN  [x]  Continue plan of care  []  Modify Goals/Treatment Plan      []  Discharge due to:  [] Other:    Short-Term Goals:  1. Complete naming tasks with 80% accuracy with modA: 100% when given brief description of common objects  2. Complete functional working memory tasks, utilizing compensatory internal/external techniques, with 80% accuracy, Andrea: Patient required moderate verbal and visual cues to utilize strategies during working memory tasks  3. Recall auditory information with 80% accuracy, Andrea: 63% with min cues from ST  4. Recall 3-5 words after delay/distraction with 80% accuracy, Andrea: not addressed  5.  Complete deductive reasoning/logic tasks with 80% accuracy, Andrea: not addressed      Yung Hunt MA, CCC-SLP    10/16/2020  12:18 PM    Future Appointments   Date Time Provider Sadfa Camilo   10/19/2020  1:30 PM [facility]   10/19/2020  2:30 PM [facility]   10/21/2020  2:30 PM [facility]   10/23/2020  1:30 PM [facility]   10/23/2020  2:30 PM Yessenia Santacruz [facility]

## 2020-10-19 ENCOUNTER — HOSPITAL ENCOUNTER (OUTPATIENT)
Dept: PHYSICAL THERAPY | Age: 75
End: 2020-10-19
Payer: MEDICARE

## 2020-10-19 ENCOUNTER — HOSPITAL ENCOUNTER (OUTPATIENT)
Dept: PHYSICAL THERAPY | Age: 75
Discharge: HOME | End: 2020-10-19
Payer: MEDICARE

## 2020-10-19 PROCEDURE — 92507 TX SP LANG VOICE COMM INDIV: CPT

## 2020-10-19 NOTE — PROGRESS NOTES
ST DAILY TREATMENT NOTE    Patient Name: Luz Maria Mukherjee  Date:10/19/2020  : 1945  [x]  Patient  Verified  Payor: Payor: [place] / Plan: [place] MEDICARE PART A & B / Product Type: Medicare /   In time:1330  Out time:1416  Total Treatment Time (min): 46      Treatment Diagnosis: Other symptoms and signs involving cognitive functions and awareness [R41.89]    SUBJECTIVE  Pain Level (0-10 scale): 7  Patient reports pain in knee and back  Any medication changes, allergies to medications, adverse drug reactions, diagnosis change, or new procedure performed?: [x] No    [] Yes (see summary sheet for update)    Subjective functional status/changes:   [x] No changes reported      OBJECTIVE  Treatment provided includes:  Increase/Improve:  []  Voice Quality [x]  Cognitive Linguistic Skills []  Laryngeal/Pharyngeal Exercises   []  Vocal Loudness []  Reading Comprehension []  Swallowing Skills    []  Vocal Cord Function []  Auditory Comprehension []  Oral Motor Skills   []  Resonance []  Writing Skills []  Compensatory strategies    []  Speech Intelligibility []  Expressive Language []  Attention   []  Breath Support/Coord. []  Receptive language [x]  Memory   []  Articulation []  Safety Awareness []    []  Fluency []  Word Retrieval []        Treatment Provided:  Patient participated in cognitive tasks using apps on ipad and phone. Patient demonstrated difficulty with problem solving, memory and math problems. Patient reports that he does not often use math for finances as his wife handles most of that. Patient required mod-max assistance to solve everyday math problems and clock math problems. Patient reported that he was struggling to \"see\" what he was supposed to be doing. When ST questioned for clarification, patient stated his brain just could not focus to know what to do. Patient receptive to ST suggestions, however, reported that he did not do any reading over the weekend as ST had recommended. Patient/Caregiver  Education: [x] Review HEP      HEP/Handouts given: none this session    Pain Level (0-10 scale) post treatment: 7    ASSESSMENT   [x]   Improving appropriately and progressing toward goals  []   Improving slowly and progressing toward goals  []   Approximating goals/maximum potential  []   Continues to benefit from skilled therapy to address remaining functional deficits  []   Not progressing toward goals and plan of care will be adjusted    Patient will continue to benefit from skilled therapy to address remaining functional deficits: executive function, memory, recall, word finding, problem solving     Progress towards goals / Updated goals:  Progressing towards goals    PLAN  [x]  Continue plan of care  []  Modify Goals/Treatment Plan      []  Discharge due to:  [] Other:    Short-Term Goals:    1. Complete naming tasks with 80% accuracy with modA: not addressed this session   2. Complete functional working memory tasks, utilizing compensatory internal/external techniques, with 80% accuracy, Andrea: Patient required moderate verbal and visual cues to utilize strategies during working memory tasks, 50% accuracy  3. Recall auditory information with 80% accuracy, Andrea: not addressed this session  4. Recall 3-5 words after delay/distraction with 80% accuracy, Andrea: not addressed this session  5.  Complete deductive reasoning/logic tasks with 80% accuracy, Andrea: Patient required mod-max cues for 25% accuracy with logic/mental flexibility tasks    Cadence Tobias, CCC-SLP  10/19/2020  3:29 PM    Future Appointments   Date Time Provider Sadaf Camilo   10/21/2020  2:30 PM [facility]   [facility]   10/23/2020  2:30 PM Mariajose Santacruz [facility]

## 2020-10-21 ENCOUNTER — HOSPITAL ENCOUNTER (OUTPATIENT)
Dept: PHYSICAL THERAPY | Age: 75
Discharge: HOME | End: 2020-10-21
Payer: MEDICARE

## 2020-10-21 PROCEDURE — 97016 VASOPNEUMATIC DEVICE THERAPY: CPT

## 2020-10-21 PROCEDURE — 97110 THERAPEUTIC EXERCISES: CPT

## 2020-10-21 NOTE — PROGRESS NOTES
PT DAILY TREATMENT NOTE     Patient Name: Jay Odell  Date:10/21/2020  : 1945  [x]  Patient  Verified  Payor: [employer] / Plan: [place] MEDICARE PART A & B / Product Type: Medicare /    In time:1435  Out time:1529  Total Treatment Time (min): 54  Total Timed Codes (min): 44     Treatment Area: Other symptoms and signs involving cognitive functions and awareness [R41.89]    SUBJECTIVE  Pain Level (0-10 scale): 5  Any medication changes, allergies to medications, adverse drug reactions, diagnosis change, or new procedure performed?: [x] No    [] Yes (see summary sheet for update)  Subjective functional status/changes:   [] No changes reported  Pt states yesterday he went walking since he his knee was feeling better and he had the energy to do so. Today he enters with B knee pain and stiffness which he attributes to walking 1 1/2 miles. OBJECTIVE  Modality rationale: decrease inflammation, decrease pain and increase tissue extensibility to improve the patients ability to participate in therapy pain free.    Min Type Additional Details    [] Estim: []Att   []Unatt  []TENS instruct                 []IFC  []Premod []NMES                       []Other:  []w/US   []w/ice   []w/heat  Position:  Location:    []  Traction: [] Cervical       []Lumbar                       [] Prone          []Supine                       []Intermittent   []Continuous Lbs:  [] before manual  [] after manual    []  Ultrasound: []Continuous   [] Pulsed                           []1MHz   []3MHz Location:  W/cm2:    []  Iontophoresis with dexamethasone         Location: [] Take home patch   [] In clinic   10 []  Ice     [x]  heat  []  Ice massage Position:  Location: L knee   10 [x]  Vasopneumatic Device Pressure: [] lo [x] med [] hi   Temp: [x] lo [] med [] hi   [] Skin assessment post-treatment:  []intact []redness- no adverse reaction       []redness  adverse reaction:       30 min Therapeutic Exercise:  [x] See flow sheet : Rationale: increase ROM, increase strength, improve coordination and improve balance to improve the patients ability to return to PLOF pain free. Pt first obtain MHP followed by 10 minutes on stationary bicycle. Pt then completed exercises to target range of motion, strength, and balance deficits. Increased height with [employer], weight with supine static extension stretch, and  repetitions for SLR. Pt had no adverse reactions. min Patient Education: [x] Review HEP    [] Progressed/Changed HEP based on:   [] positioning   [] body mechanics   [] transfers   [] heat/ice application        Pain Level (0-10 scale) post treatment: 1    ASSESSMENT/Changes in Function: Pt showed better mobility and endurance compared to previous visit with less pain present. Patient will continue to benefit from skilled PT services to modify and progress therapeutic interventions, address functional mobility deficits, address ROM deficits, address strength deficits and analyze and address soft tissue restrictions to attain remaining goals.      [x]  See Plan of Care  []  See progress note/recertification  []  See Discharge Summary        PLAN  [x]  Upgrade activities as tolerated     []  Continue plan of care  []  Update interventions per flow sheet       []  Discharge due to:_  []  Other:_      ADDI Kathleen  10/21/2020  5:09 PM

## 2020-10-23 ENCOUNTER — HOSPITAL ENCOUNTER (OUTPATIENT)
Dept: PHYSICAL THERAPY | Age: 75
Discharge: HOME | End: 2020-10-23
Payer: MEDICARE

## 2020-10-23 PROCEDURE — 92507 TX SP LANG VOICE COMM INDIV: CPT

## 2020-10-23 PROCEDURE — 97110 THERAPEUTIC EXERCISES: CPT

## 2020-10-23 PROCEDURE — 97016 VASOPNEUMATIC DEVICE THERAPY: CPT

## 2020-10-23 NOTE — PROGRESS NOTES
PT DAILY TREATMENT NOTE 8    Patient Name: Juventino Avila  Date:10/23/2020  : 1945  [x]  Patient  Verified  Payor: [place] MEDICARE / Plan: [place] MEDICARE PART A & B / Product Type: Medicare /    In time:1430  Out time:1526  Total Treatment Time (min): 56  Total Timed Codes (min): 46    Treatment Area: Pain in left knee [M25.562]     SUBJECTIVE  Pain Level (0-10 scale): 6  Any medication changes, allergies to medications, adverse drug reactions, diagnosis change, or new procedure performed?: [x] No    [] Yes (see summary sheet for update)  Subjective functional status/changes:   [] No changes reported  Pt stated that yesterda while completing HEP he \"stretch too much\" causing pain which has continued into today. OBJECTIVE  Modality rationale: decrease inflammation, decrease pain and increase tissue extensibility to improve the patients ability to participate in physical therapy .    Min Type Additional Details    [] Estim: []Att   []Unatt  []TENS instruct                 []IFC  []Premod []NMES                       []Other:  []w/US   []w/ice   []w/heat  Position:  Location:    []  Traction: [] Cervical       []Lumbar                       [] Prone          []Supine                       []Intermittent   []Continuous Lbs:  [] before manual  [] after manual    []  Ultrasound: []Continuous   [] Pulsed                           []1MHz   []3MHz Location:  W/cm2:    []  Iontophoresis with dexamethasone         Location: [] Take home patch   [] In clinic   10 []  Ice     [x]  heat  []  Ice massage Position:  Location: L knee    10 []  Vasopneumatic Device Pressure: [] lo [x] med [] hi   Temp: [x] lo [] med [] hi   [] Skin assessment post-treatment:  []intact []redness- no adverse reaction       []redness  adverse reaction:       35 min Therapeutic Exercise:  [x] See flow sheet :   Rationale: increase ROM, increase strength, improve balance and increase proprioception to improve the patients ability to return to baseline pain free. min Patient Education: [x] Review HEP    [] Progressed/Changed HEP based on:   [] positioning   [] body mechanics   [] transfers   [] heat/ice application        Other Objective/Functional Measures: -2-119 degrees AROM    Pain Level (0-10 scale) post treatment: 1    ASSESSMENT/Changes in Function: Pt continues to slowly progress through physical therapy showing more interest and motivation today. Increased resistance to SLR and LAQ while adding lateral step ups with no adverse effects. Patient will continue to benefit from skilled PT services to modify and progress therapeutic interventions, address functional mobility deficits, address ROM deficits, address strength deficits and analyze and address soft tissue restrictions to attain remaining goals.      [x]  See Plan of Care  []  See progress note/recertification  []  See Discharge Summary          PLAN  [x]  Upgrade activities as tolerated     [x]  Continue plan of care  []  Update interventions per flow sheet       []  Discharge due to:_  []  Other:_      ADDI Kathleen  10/23/2020  4:54 PM

## 2020-10-23 NOTE — PROGRESS NOTES
ST DAILY TREATMENT NOTE    Patient Name: Cuate Brandt  Date:10/23/2020  : 1945  [x]  Patient  Verified  Payor: Payor: [place] MEDICARE / Plan: [place] MEDICARE PART A & B / Product Type: Medicare /   In time:1339  Out time:1428  Total Treatment Time (min): 49      Treatment Diagnosis: Other symptoms and signs involving cognitive functions and awareness [R41.89]    SUBJECTIVE  Pain Level (0-10 scale): 6  Any medication changes, allergies to medications, adverse drug reactions, diagnosis change, or new procedure performed?: [x] No    [] Yes (see summary sheet for update)    Subjective functional status/changes:   [x] No changes reported      OBJECTIVE  Treatment provided includes:  Increase/Improve:  []  Voice Quality [x]  Cognitive Linguistic Skills []  Laryngeal/Pharyngeal Exercises   []  Vocal Loudness []  Reading Comprehension []  Swallowing Skills    []  Vocal Cord Function []  Auditory Comprehension []  Oral Motor Skills   []  Resonance []  Writing Skills []  Compensatory strategies    []  Speech Intelligibility []  Expressive Language []  Attention   []  Breath Support/Coord. []  Receptive language []  Memory   []  Articulation []  Safety Awareness []    []  Fluency []  Word Retrieval []        Treatment Provided:  Patient participated in problem solving,  Sequencing and inferencing tasks using [employer]. Patient listened to voicemails and answered questions presented with 100% accuracy. Patient answered questions about the voicemails at the end of the session after other tasks were completed with 56% accuracy. ST assisted patient with inputting appointments into his calendar on his phone as a strategy to remember when his ST and PT appointments are. ST discussed other compensatory strategies to improve recall. Patient was receptive and agreeable.      Patient/Caregiver  Education: patient educated on progress and plan of care and was agreeable          Pain Level (0-10 scale) post treatment: 6    ASSESSMENT   [x]   Improving appropriately and progressing toward goals  []   Improving slowly and progressing toward goals  []   Approximating goals/maximum potential  []   Continues to benefit from skilled therapy to address remaining functional deficits  []   Not progressing toward goals and plan of care will be adjusted    Patient will continue to benefit from skilled therapy to address remaining functional deficits: executive function, memory, recall, word finding, problem solving     Progress towards goals / Updated goals:  Progressing towards goals, continue POC    PLAN  [x]  Continue plan of care  []  Modify Goals/Treatment Plan      []  Discharge due to:  [] Other:    Short-Term Goals:  1. Complete naming tasks with 80% accuracy with modA: not addressed this session   2. Complete functional working memory tasks, utilizing compensatory internal/external techniques, with 80% accuracy, Andrea: Patient required moderate verbal and visual cues to utilize strategies during working memory tasks, 62% accuracy  3. Recall auditory information with 80% accuracy, Andrea: 100% immediate memory, 56% delayed recall  4. Recall 3-5 words after delay/distraction with 80% accuracy, Andrea: not addressed this session  5. Complete deductive reasoning/logic tasks with 80% accuracy, Andrea: not addressed this session        Rachel Altamirano MA, CCC-SLP    10/23/2020  3:18 PM    No future appointments.

## 2020-10-26 ENCOUNTER — HOSPITAL ENCOUNTER (OUTPATIENT)
Dept: PHYSICAL THERAPY | Age: 75
Discharge: HOME | End: 2020-10-26
Payer: MEDICARE

## 2020-10-26 PROCEDURE — 97016 VASOPNEUMATIC DEVICE THERAPY: CPT

## 2020-10-26 PROCEDURE — 97110 THERAPEUTIC EXERCISES: CPT

## 2020-10-26 PROCEDURE — 92507 TX SP LANG VOICE COMM INDIV: CPT

## 2020-10-26 NOTE — PROGRESS NOTES
PT DAILY TREATMENT NOTE     Patient Name: Luz Maria Mukherjee  Date:10/26/2020  : 1945  [x]  Patient  Verified  Payor: [facility] / Plan: [place] MEDICARE PART A & B / Product Type: Medicare /    In time:  Out time:1532  Total Treatment Time (min): 64  Total Timed Codes (min): 54    Visit #: 25    Treatment Area: Other symptoms and signs involving cognitive functions and awareness [R41.89]    SUBJECTIVE  Pain Level (0-10 scale): 5/10  Any medication changes, allergies to medications, adverse drug reactions, diagnosis change, or new procedure performed?: [x] No    [] Yes (see summary sheet for update)  Subjective functional status/changes:   [] No changes reported  Pt reported pain on arrival as he had been here with SLP prior to PT keo't and sitting with knee bent in chair made it begin to hurt more for an extended period of time. OBJECTIVE  Modality rationale: decrease pain and increase tissue extensibility to improve the patients ability to return to daily activity.     Min Type Additional Details    [] Estim: []Att   []Unatt  []TENS instruct                 []IFC  []Premod []NMES                       []Other:  []w/US   []w/ice   []w/heat  Position:  Location:    []  Traction: [] Cervical       []Lumbar                       [] Prone          []Supine                       []Intermittent   []Continuous Lbs:  [] before manual  [] after manual    []  Ultrasound: []Continuous   [] Pulsed                           []1MHz   []3MHz Location:  W/cm2:    []  Iontophoresis with dexamethasone         Location: [] Take home patch   [] In clinic   15 []  Ice     [x]  heat  []  Ice massage Position:seated in ext  L kneeLocation:   10 [x]  Vasopneumatic Device Pressure: [] lo [x] med [] hi   Temp: [x] lo [] med [] hi         39 min Therapeutic Exercise:  [x] See flow sheet :   Rationale: increase ROM, increase strength and improve balance to improve the patients ability to return to prior level of function and perform daily activities in a pain free environment. Began session with MH in seated knee extension, followed by warm up on LBE, and stretches. Held most strengthening today as pt has higher pain levels and needed several rest breaks for pain to ease. min Patient Education: [x] Review HEP    [] Progressed/Changed HEP based on:   [] positioning   [] body mechanics   [] transfers   [] heat/ice application            Pain Level (0-10 scale) post treatment: 5/10    ASSESSMENT/Changes in Function: Pt making slow progress but appears motivated when feeling well, due to other complications since surgery. Pt has had heart issues also. Patient will continue to benefit from skilled PT services to address functional mobility deficits, address ROM deficits, address strength deficits and analyze and modify body mechanics/ergonomics to attain remaining goals.      [x]  See Plan of Care  []  See progress note/recertification  []  See Discharge Summary             PLAN  []  Upgrade activities as tolerated     [x]  Continue plan of care  []  Update interventions per flow sheet       []  Discharge due to:_  []  Other:_      ADDI Vega  10/26/2020  5:28 PM

## 2020-10-26 NOTE — PROGRESS NOTES
ST DAILY TREATMENT NOTE    Patient Name: Aziza Bobbi  Date:10/26/2020  : 1945  [x]  Patient  Verified  Payor: Payor: [place] MEDICARE / Plan: [place] MEDICARE PART A & B / Product Type: Medicare /   In time: 1592  Out time:1420  Total Treatment Time (min): 43      Treatment Diagnosis: Other symptoms and signs involving cognitive functions and awareness [R41.89]    SUBJECTIVE  Pain Level (0-10 scale): 5  Any medication changes, allergies to medications, adverse drug reactions, diagnosis change, or new procedure performed?: [x] No    [] Yes (see summary sheet for update)    Subjective functional status/changes:   [x] No changes reported    OBJECTIVE  Treatment provided includes:  Increase/Improve:  []  Voice Quality [x]  Cognitive Linguistic Skills []  Laryngeal/Pharyngeal Exercises   []  Vocal Loudness []  Reading Comprehension []  Swallowing Skills    []  Vocal Cord Function []  Auditory Comprehension []  Oral Motor Skills   []  Resonance []  Writing Skills []  Compensatory strategies    []  Speech Intelligibility []  Expressive Language []  Attention   []  Breath Support/Coord. []  Receptive language [x]  Memory   []  Articulation []  Safety Awareness []    []  Fluency []  Word Retrieval []        Treatment Provided:  Patient participated in convergent and divergent naming tasks targeting concrete and abstract categories with min cues to independence. Patient participated in conversation with Olinda Sheehan Dr and was making jokes. Patient stated that he was encouraged by his progress both with ST and PT.      Patient/Caregiver  Education: [] Review HEP      Patient educated on progress and continued need for therapy and was agreeable    Pain Level (0-10 scale) post treatment: 5    ASSESSMENT   [x]   Improving appropriately and progressing toward goals  []   Improving slowly and progressing toward goals  []   Approximating goals/maximum potential  []   Continues to benefit from skilled therapy to address remaining functional deficits  []   Not progressing toward goals and plan of care will be adjusted    Patient will continue to benefit from skilled therapy to address remaining functional deficits: executive function, memory, recall, word finding, problem solving     Progress towards goals / Updated goals:  Progressing towards goals, continue plan of care     PLAN  [x]  Continue plan of care  []  Modify Goals/Treatment Plan      []  Discharge due to:  [] Other:    Short-Term Goals:    1. Complete naming tasks with 80% accuracy with modA: [divergent naming tasks with 95% accuracy]  2. Complete functional working memory tasks, utilizing compensatory internal/external techniques, with 80% accuracy, Andrea: Not addressed  3. Recall auditory information with 80% accuracy, Andrea: Not addressed  4. Recall 3-5 words after delay/distraction with 80% accuracy, Andrea: not addressed this session  5.  Complete deductive reasoning/logic tasks with 80% accuracy, Andrea: not addressed this session         Ruben Mena MA, CCC-SLP  10/26/2020  4:00 PM    Future Appointments   Date Time Provider Sadaf Camilo   10/28/2020  1:30 PM Marin Sexton [facility]   10/28/2020  2:30 PM [facility]   10/30/2020  2:30 PM [facility]

## 2020-10-28 ENCOUNTER — HOSPITAL ENCOUNTER (OUTPATIENT)
Dept: PHYSICAL THERAPY | Age: 75
Discharge: HOME | End: 2020-10-28
Payer: MEDICARE

## 2020-10-28 PROCEDURE — 97110 THERAPEUTIC EXERCISES: CPT

## 2020-10-28 PROCEDURE — 92507 TX SP LANG VOICE COMM INDIV: CPT

## 2020-10-28 PROCEDURE — 97016 VASOPNEUMATIC DEVICE THERAPY: CPT

## 2020-10-28 NOTE — PROGRESS NOTES
ST DAILY TREATMENT NOTE    Patient Name: Basia Short  Date:10/28/2020  : 1945  [x]  Patient  Verified  Payor: Payor: [place] MEDICARE / Plan: [place] MEDICARE PART A & B / Product Type: Medicare /   In time:1330  Out time:1405  Total Treatment Time (min): 35    Treatment Diagnosis: Other symptoms and signs involving cognitive functions and awareness [R41.89]    SUBJECTIVE  Pain Level (0-10 scale): 0  Any medication changes, allergies to medications, adverse drug reactions, diagnosis change, or new procedure performed?: [x] No    [] Yes (see summary sheet for update)    Subjective functional status/changes:   [x] No changes reported      OBJECTIVE  Treatment provided includes:  Increase/Improve:  []  Voice Quality [x]  Cognitive Linguistic Skills []  Laryngeal/Pharyngeal Exercises   []  Vocal Loudness []  Reading Comprehension []  Swallowing Skills    []  Vocal Cord Function []  Auditory Comprehension []  Oral Motor Skills   []  Resonance []  Writing Skills []  Compensatory strategies    []  Speech Intelligibility []  Expressive Language []  Attention   []  Breath Support/Coord. []  Receptive language []  Memory   []  Articulation []  Safety Awareness []    []  Fluency []  Word Retrieval []        Treatment Provided:  Patient participated in STM/recall tasks where the ST read passages aloud and the patient answered open ended, true/false and multiple choice questions. Patient completed tasks with 55% accuracy. ST revisited first passage at the end of the session and patient recalled basic information with 63% accuracy after a 15 minute delay. Patient demonstrated difficulty with basic addition and subtraction with 3+ digits, however, when verbally presented with 2 digit addition and subtraction, patient completed with 100% accuracy.      Patient/Caregiver  Education: Patient educated on plan of care and progress towards goals and was agreeable     Pain Level (0-10 scale) post treatment: 5    ASSESSMENT   [x] Improving appropriately and progressing toward goals  []   Improving slowly and progressing toward goals  []   Approximating goals/maximum potential  []   Continues to benefit from skilled therapy to address remaining functional deficits  []   Not progressing toward goals and plan of care will be adjusted    Patient will continue to benefit from skilled therapy to address remaining functional deficits: executive function, memory, recall, word finding, problem solving     Progress towards goals / Updated goals:  Progressing towards goals, continue plan of care    PLAN  [x]  Continue plan of care  []  Modify Goals/Treatment Plan      []  Discharge due to:  [] Other:    Short-Term Goals:     1. Complete naming tasks with 80% accuracy with modA: [not addressed]  2. Complete functional working memory tasks, utilizing compensatory internal/external techniques, with 80% accuracy, Andrea: Not addressed  3. Recall auditory information with 80% accuracy, Andrea: [55% accuracy]  4. Recall 3-5 words after delay/distraction with 80% accuracy, Andrea: [63% accuracy]  5.  Complete deductive reasoning/logic tasks with 80% accuracy, Andrea: not addressed this session       Amanda Howard MA, CCC-SLP  10/28/2020  2:39 PM    Future Appointments   Date Time Provider Sadaf Camilo   10/30/2020  [facility]

## 2020-10-28 NOTE — PROGRESS NOTES
PT DAILY TREATMENT NOTE     Patient Name: Abel Abel  Date:10/28/2020  : 1945  [x]  Patient  Verified  Payor: [place] MED[place]ARE / Plan: [place] MED[place]ARE [place] & B / Product Type: Medicare /    In time:1426 Out time:1525  Total Treatment Time (min): 61  Total Timed Codes (min): 51    Visit #: 26    Treatment Area: Other symptoms and signs involving cognitive functions and awareness [R41.89]    SUBJECTIVE  Pain Level (0-10 scale): 5/10  Any medication changes, allergies to medications, adverse drug reactions, diagnosis change, or new procedure performed?: [x] No    [] Yes (see summary sheet for update)  Subjective functional status/changes:   [] No changes reported  Pt states that his pain level remains the same but has \"relocated\" to the lateral side of knee instead of the medial side. OBJECTIVE  Modality rationale: decrease inflammation, decrease pain and increase tissue extensibility to improve the patients ability to return to pain free function.    Min Type Additional Details    [] Estim: []Att   []Unatt  []TENS instruct                 []IFC  []Premod []NMES                       []Other:  []w/US   []w/ice   []w/heat  Position:  Location:    []  Traction: [] Cervical       []Lumbar                       [] Prone          []Supine                       []Intermittent   []Continuous Lbs:  [] before manual  [] after manual    []  Ultrasound: []Continuous   [] Pulsed                           []1MHz   []3MHz Location:  W/cm2:    []  Iontophoresis with dexamethasone         Location: [] Take home patch   [] In clinic   10 []  Ice     [x]  heat  []  Ice massage Position: seated  Location: l knee   10 [x]  Vasopneumatic Device Pressure: [] lo [] med [] hi   Temp: [] lo [] med [] hi   [] Skin assessment post-treatment:  []intact []redness- no adverse reaction       []redness  adverse reaction:       41 min Therapeutic Exercise:  [x] See flow sheet :   Rationale: increase ROM, increase strength and improve balance to improve the patients ability to return ted prior level of function in a pain free range. Began session with MH to improve tissue extensibility, followed by LBE to promote joint mobility, stretching, step ups, extension stretch and strengthening per flow sheet. min Patient Education: [x] Review HEP    [] Progressed/Changed HEP based on:   [] positioning   [] body mechanics   [] transfers   [] heat/ice application        Pain Level (0-10 scale) post treatment: 0/10    ASSESSMENT/Changes in Function: Pt making slow progress secondary to other medical set backs, and increased pain levels. Will cont POC to try and improve on afore mentioned deficits. Patient will continue to benefit from skilled PT services to address ROM deficits and address strength deficits to attain remaining goals.      [x]  See Plan of Care  []  See progress note/recertification  []  See Discharge Summary         PLAN  []  Upgrade activities as tolerated     [x]  Continue plan of care  []  Update interventions per flow sheet       []  Discharge due to:_  []  Other:_      ADDI Boogie  10/28/2020  4:08 PM

## 2020-10-30 ENCOUNTER — HOSPITAL ENCOUNTER (OUTPATIENT)
Dept: PHYSICAL THERAPY | Age: 75
Discharge: HOME | End: 2020-10-30
Payer: MEDICARE

## 2020-10-30 PROCEDURE — 97110 THERAPEUTIC EXERCISES: CPT

## 2020-10-30 PROCEDURE — 97140 MANUAL THERAPY 1/> REGIONS: CPT

## 2020-10-30 NOTE — PROGRESS NOTES
PT DAILY TREATMENT NOTE 8    Patient Name: Tonja Bansal  Date:10/30/2020  : 1945  [x]  Patient  Verified  Payor: Kristyn Hooks / Plan: [place] MEDICARE PART A & B / Product Type: Medicare /    In VBQK:1054  Out time:1526  Total Treatment Time (min): 54  Total Timed Codes (min): 51    Visit #: 27    Treatment Area: Other symptoms and signs involving cognitive functions and awareness [R41.89]    SUBJECTIVE  Pain Level (0-10 scale): 5/10  Any medication changes, allergies to medications, adverse drug reactions, diagnosis change, or new procedure performed?: [x] No    [] Yes (see summary sheet for update)  Subjective functional status/changes:   [] No changes reported  Pt states that he is frustrated about pain issues that he continues to have. OBJECTIVE      33 min Therapeutic Exercise:  [x] See flow sheet :   Rationale: increase ROM, increase strength and improve balance to improve the patients ability to perform daily tasks pain free. 17 min Manual Therapy:     Rationale: increase tissue extensibility and decrease trigger points to release tightness in knee joint, as pt has increased medial pain and tightness. min Patient Education: [x] Review HEP    [] Progressed/Changed HEP based on:   [] positioning   [] body mechanics   [] transfers   [] heat/ice application        Other Objective/Functional Measures: Began session with active warm up on LBE to improve ROM, followed by stretches and balance per flow sheet. Pt had c/o increasing tightness. Performed manual to medial knee . Pt expressed very sore and tender. Pain Level (0-10 scale) post treatment: 3/10    ASSESSMENT/Changes in Function: decreased pain post manual to knee. Pt making good ROM and strength gains but cont's to have increased pain. Patient will continue to benefit from skilled PT services to address functional mobility deficits, address ROM deficits and address strength deficits to attain remaining goals.      [x] See Plan of Care  []  See progress note/recertification  []  See Discharge Summary               PLAN  []  Upgrade activities as tolerated     [x]  Continue plan of care  []  Update interventions per flow sheet       []  Discharge due to:_  []  Other:_      ADDI Martin   10/30/2020  5:10 PM

## 2020-11-02 ENCOUNTER — HOSPITAL ENCOUNTER (OUTPATIENT)
Dept: PHYSICAL THERAPY | Age: 75
Discharge: HOME | End: 2020-11-02
Payer: MEDICARE

## 2020-11-02 PROCEDURE — 92507 TX SP LANG VOICE COMM INDIV: CPT

## 2020-11-02 PROCEDURE — 97110 THERAPEUTIC EXERCISES: CPT

## 2020-11-02 NOTE — PROGRESS NOTES
PT DAILY TREATMENT NOTE     Patient Name: Wili Metz  Date:2020  : 1945  [x]  Patient  Verified  Payor: [place] MEDICARE / Plan: [place] MEDICARE PART A & B / Product Type: Medicare /    In time:1348  Out time:1415  Total Treatment Time (min): 27  Total Timed Codes (min): 27  1:1 Treatment Time (min): 27       Treatment Area: Pain in left knee [M25.562]    SUBJECTIVE  Pt states that his knee has felt better after \"massage\" last week and that he has been able to walk with less pain. Pain Level (0-10 scale): 2/10  Any medication changes, allergies to medications, adverse drug reactions, diagnosis change, or new procedure performed?: [x] No    [] Yes (see summary sheet for update)        OBJECTIVE  Modality rationale: Pt declined. Min Type Additional Details    [] Estim: []Att   []Unatt  []TENS instruct                 []IFC  []Premod []NMES                       []Other:  []w/US   []w/ice   []w/heat  Position:  Location:    []  Traction: [] Cervical       []Lumbar                       [] Prone          []Supine                       []Intermittent   []Continuous Lbs:  [] before manual  [] after manual    []  Ultrasound: []Continuous   [] Pulsed                           []1MHz   []3MHz Location:  W/cm2:         []  Ice     []  heat  []  Ice massage Position:  Location:    []  Vasopneumatic Device Pressure: [] lo [] med [] hi   Temp: [] lo [] med [] hi       27 min Therapeutic Exercise:  [x] See flow sheet :   Rationale: increase ROM and increase strength to improve the patients ability to maximize functional independence & safety        With TE Patient Education: [x] Review HEP    [] Progressed/Changed HEP based on:   [] positioning   [] body mechanics   [] transfers   [] heat/ice application          Pain Level (0-10 scale) post treatment: 0/10    ASSESSMENT/Changes in Function: Pt seen today for reassessment of knee ROM, LE strength, function, HEP review.           []  See Plan of Care  []  See progress note/recertification  [x]  See Discharge Summary             PLAN  []  Upgrade activities as tolerated     []  Continue plan of care  []  Update interventions per flow sheet       [x]  Discharge due to: personal & functional goals achieved.   []  Other:_      Roberto Valenzuela PT, DPT 11/2/2020  1:58 PM

## 2020-11-02 NOTE — PROGRESS NOTES
ST DAILY TREATMENT NOTE    Patient Name: Devang Keys  Date:2020  : 1945  [x]  Patient  Verified  Payor: Payor: [place] MEDICARE / Plan: [place] MEDICARE PART A & B / Product Type: Medicare /   In time:1305  Out time:1347  Total Treatment Time (min): 42      Treatment Diagnosis: Other symptoms and signs involving cognitive functions and awareness [R41.89]    SUBJECTIVE  Pain Level (0-10 scale): 3 knee pain  Any medication changes, allergies to medications, adverse drug reactions, diagnosis change, or new procedure performed?: [x] No    [] Yes (see summary sheet for update)    Subjective functional status/changes:   [x] No changes reported      OBJECTIVE  Treatment provided includes:  Increase/Improve:  []  Voice Quality [x]  Cognitive Linguistic Skills []  Laryngeal/Pharyngeal Exercises   []  Vocal Loudness []  Reading Comprehension []  Swallowing Skills    []  Vocal Cord Function []  Auditory Comprehension []  Oral Motor Skills   []  Resonance []  Writing Skills []  Compensatory strategies    []  Speech Intelligibility []  Expressive Language []  Attention   []  Breath Support/Coord. []  Receptive language []  Memory   []  Articulation []  Safety Awareness []    []  Fluency []  Word Retrieval []        Treatment Provided:  Patient participated in naming and word finding skills at word and conversation level with 100% accuracy. Patient required cues for 3% of word finding problems. Patient completed reasoning tasks with 94% accuracy. .     Patient/Caregiver  Education: Patient educated on progress and d/c of completed goals.      Pain Level (0-10 scale) post treatment: 3 knee pain    ASSESSMENT   [x]   Improving appropriately and progressing toward goals  []   Improving slowly and progressing toward goals  []   Approximating goals/maximum potential  []   Continues to benefit from skilled therapy to address remaining functional deficits  []   Not progressing toward goals and plan of care will be adjusted    Patient will continue to benefit from skilled therapy to address remaining functional deficits:  executive function, memory, recall, word finding, problem solving     Progress towards goals / Updated goals:  Progressing towards goals, continue plan of care    PLAN  [x]  Continue plan of care  []  Modify Goals/Treatment Plan      []  Discharge due to:  [] Other:    Short-Term Goals:  1. Complete naming tasks with 80% accuracy with modA: [100% goal met]  2. Complete functional working memory tasks, utilizing compensatory internal/external techniques, with 80% accuracy, Andrea: Not addressed  3. Recall auditory information with 80% accuracy, Andrea: [not addressed]  4. Recall 3-5 words after delay/distraction with 80% accuracy, Andrea: [not addressed]  5.  Complete deductive reasoning/logic tasks with 80% accuracy, Andrea: [96% accuracy]      Robin Thomas MA, CCC-SLP  11/2/2020  2:03 PM    Future Appointments   Date Time Provider Sadaf Camilo   11/4/2020  1:00 PM Mckenna Kruse [facility]   11/4/2020  1:45 PM Lynn Gilliam [facility]   11/6/2020  1:45 PM Pam Santacruz [facility]

## 2020-11-04 ENCOUNTER — HOSPITAL ENCOUNTER (OUTPATIENT)
Dept: PHYSICAL THERAPY | Age: 75
Discharge: HOME | End: 2020-11-04
Payer: MEDICARE

## 2020-11-04 ENCOUNTER — APPOINTMENT (OUTPATIENT)
Dept: PHYSICAL THERAPY | Age: 75
End: 2020-11-04
Payer: MEDICARE

## 2020-11-04 PROCEDURE — 92507 TX SP LANG VOICE COMM INDIV: CPT

## 2020-11-04 NOTE — PROGRESS NOTES
ST DAILY TREATMENT NOTE    Patient Name: Luz Maria Mukherjee  Date:2020  : 1945  [x]  Patient  Verified  Payor: Payor: [place] MEDICARE / Plan: [place] MEDICARE PART A & B / Product Type: Medicare /   In time:1308  Out time:1339  Total Treatment Time (min): 31      Treatment Diagnosis: Other symptoms and signs involving cognitive functions and awareness [R41.89]    SUBJECTIVE  Pain Level (0-10 scale): 2  Any medication changes, allergies to medications, adverse drug reactions, diagnosis change, or new procedure performed?: [x] No    [] Yes (see summary sheet for update)    Subjective functional status/changes:   [x] No changes reported      OBJECTIVE  Treatment provided includes:  Increase/Improve:  []  Voice Quality [x]  Cognitive Linguistic Skills []  Laryngeal/Pharyngeal Exercises   []  Vocal Loudness []  Reading Comprehension []  Swallowing Skills    []  Vocal Cord Function []  Auditory Comprehension []  Oral Motor Skills   []  Resonance []  Writing Skills []  Compensatory strategies    []  Speech Intelligibility []  Expressive Language []  Attention   []  Breath Support/Coord. []  Receptive language []  Memory   []  Articulation []  Safety Awareness []    []  Fluency []  Word Retrieval []        Treatment Provided:  Patient participated in cognitive and memory tasks with min verbal cues from [facility]. Patient reports that he has noticed improvements in daily activities at home. Reassessment and discharge was discussed for next session and patient was agreeable.      Patient/Caregiver  Education: Patient educated on progress and possible upcoming discharge         Pain Level (0-10 scale) post treatment: 2    ASSESSMENT   [x]   Improving appropriately and progressing toward goals  []   Improving slowly and progressing toward goals  []   Approximating goals/maximum potential  []   Continues to benefit from skilled therapy to address remaining functional deficits  []   Not progressing toward goals and plan of care will be adjusted    Patient will continue to benefit from skilled therapy to address remaining functional deficits: executive function, memory, recall, word finding, problem solving     Progress towards goals / Updated goals:  Progressing towards goals, continue plan of care. Discharge discussed pending re-evaluation results next session    PLAN  [x]  Continue plan of care  []  Modify Goals/Treatment Plan      []  Discharge due to:  [] Other:    Short-Term Goals:  1. Complete naming tasks with 80% accuracy with modA: [100% goal met]  2. Complete functional working memory tasks, utilizing compensatory internal/external techniques, with 80% accuracy, Andrea:  92% with min verbal cues   3. Recall auditory information with 80% accuracy, Andrea: [not addressed]  4. Recall 3-5 words after delay/distraction with 80% accuracy, Andrea: [60%]  5.  Complete deductive reasoning/logic tasks with 80% accuracy, Andrea: [100% accuracy]          Katie Metzger MA, CCC-SLP  11/4/2020  3:57 PM    Future Appointments   Date Time Provider Sadaf Camilo   11/11/2020  1:00 PM [facility]

## 2020-11-06 ENCOUNTER — TRANSCRIBE ORDER (OUTPATIENT)
Dept: SCHEDULING | Age: 75
End: 2020-11-06

## 2020-11-06 ENCOUNTER — APPOINTMENT (OUTPATIENT)
Dept: PHYSICAL THERAPY | Age: 75
End: 2020-11-06
Payer: MEDICARE

## 2020-11-06 DIAGNOSIS — R55 SYNCOPE AND COLLAPSE: Primary | ICD-10-CM

## 2020-11-11 ENCOUNTER — HOSPITAL ENCOUNTER (OUTPATIENT)
Dept: PHYSICAL THERAPY | Age: 75
Discharge: HOME | End: 2020-11-11
Payer: MEDICARE

## 2020-11-11 PROCEDURE — 92507 TX SP LANG VOICE COMM INDIV: CPT

## 2020-11-11 NOTE — PROGRESS NOTES
ST DAILY TREATMENT NOTE    Patient Name: Janeth Lim  Date:2020  : 1945  [x]  Patient  Verified  Payor: Payor: Eileen Mena / Plan: [place] MEDICARE PART A & B / Product Type: Medicare /   In time:1300  Out time:1340  Total Treatment Time (min): 40      Treatment Diagnosis: Other symptoms and signs involving cognitive functions and awareness [R41.89]    SUBJECTIVE  Pain Level (0-10 scale): 0  Any medication changes, allergies to medications, adverse drug reactions, diagnosis change, or new procedure performed?: [x] No    [] Yes (see summary sheet for update)    Subjective functional status/changes:   [] No changes reported  Patient reported that he has been sleeping for most of the day, every day since Thursday. Patient also reports decreased appetite    OBJECTIVE  Treatment provided includes:  Increase/Improve:  []  Voice Quality [x]  Cognitive Linguistic Skills []  Laryngeal/Pharyngeal Exercises   []  Vocal Loudness []  Reading Comprehension []  Swallowing Skills    []  Vocal Cord Function []  Auditory Comprehension []  Oral Motor Skills   []  Resonance []  Writing Skills []  Compensatory strategies    []  Speech Intelligibility []  Expressive Language []  Attention   []  Breath Support/Coord. []  Receptive language []  Memory   []  Articulation []  Safety Awareness []    []  Fluency []  Word Retrieval []        Treatment Provided:  Patient re-assessed for 10th visit. ST administered the [facility] in which patient scored 24/30. Patient with continued visuospatial deficits, calculation, and language fluency. Patient's score on first administration of the [facility] was 17/30. Patient has planned to be discharged on this date. However, due to recent reports of increased fatigue and appetite loss, ST explained that patient's chart will be left open. Patient stated that he would return if he felt he needed to within 30 days.      Patient/Caregiver  Education: Patient educated on discharge/ continued need for therapy          Pain Level (0-10 scale) post treatment: 0    ASSESSMENT   []   Improving appropriately and progressing toward goals  []   Improving slowly and progressing toward goals  []   Approximating goals/maximum potential  []   Continues to benefit from skilled therapy to address remaining functional deficits  []   Not progressing toward goals and plan of care will be adjusted    Patient will continue to benefit from skilled therapy to address remaining functional deficits: Patient prepared for discharge    Progress towards goals / Updated goals:  Patient goals met     PLAN  []  Continue plan of care  []  Modify Goals/Treatment Plan      [x]  Discharge due to: goals met  [] Other:    Short-Term Goals:  1. Complete naming tasks with 80% accuracy with modA: [100% goal met]  2. Complete functional working memory tasks, utilizing compensatory internal/external techniques, with 80% accuracy, Andrea:  92% with min verbal cues   3. Recall auditory information with 80% accuracy, Andrea: [80% goal met]  4. Recall 3-5 words after delay/distraction with 80% accuracy, Andrea: [80% goal met]  5. Complete deductive reasoning/logic tasks with 80% accuracy, Andrea: [100% accuracy]          Dwayne Cohen MA, CCC-SLP  11/11/2020  2:13 PM    No future appointments.

## 2020-11-16 ENCOUNTER — HOSPITAL ENCOUNTER (OUTPATIENT)
Dept: VASCULAR SURGERY | Age: 75
Discharge: HOME | End: 2020-11-16
Payer: MEDICARE

## 2020-11-16 ENCOUNTER — OFFICE VISIT (OUTPATIENT)
Dept: ORTHOPEDIC SURGERY | Age: 75
End: 2020-11-16
Payer: MEDICARE

## 2020-11-16 DIAGNOSIS — R55 SYNCOPE AND COLLAPSE: ICD-10-CM

## 2020-11-16 DIAGNOSIS — M17.12 PRIMARY OSTEOARTHRITIS OF LEFT KNEE: Primary | ICD-10-CM

## 2020-11-16 LAB
LEFT CCA DIST DIAS: 19 CM/S
LEFT CCA DIST SYS: 61 CM/S
LEFT CCA MID DIAS: 19 CM/S
LEFT CCA MID SYS: 62 CM/S
LEFT CCA PROX DIAS: 22 CM/S
LEFT CCA PROX SYS: 96 CM/S
LEFT ECA SYS: 68 CM/S
LEFT ICA DIST DIAS: 24 CM/S
LEFT ICA DIST SYS: 68 CM/S
LEFT ICA MID DIAS: 14 CM/S
LEFT ICA MID SYS: 62 CM/S
LEFT ICA PROX DIAS: 16 CM/S
LEFT ICA PROX SYS: 78 CM/S
LEFT ICA/CCA SYS: 1.28
LEFT SUBCLAVIAN SYS: 102 CM/S
LEFT VERTEBRAL DIAS: 12 CM/S
LEFT VERTEBRAL SYS: 39 CM/S
RIGHT CCA DIST DIAS: 14 CM/S
RIGHT CCA DIST SYS: 61 CM/S
RIGHT CCA MID DIAS: 17 CM/S
RIGHT CCA MID SYS: 90 CM/S
RIGHT CCA PROX DIAS: 23 CM/S
RIGHT CCA PROX SYS: 104 CM/S
RIGHT ECA SYS: 71 CM/S
RIGHT ICA DIST DIAS: 25 CM/S
RIGHT ICA DIST SYS: 77 CM/S
RIGHT ICA MID DIAS: 24 CM/S
RIGHT ICA MID SYS: 59 CM/S
RIGHT ICA PROX DIAS: 15 CM/S
RIGHT ICA PROX SYS: 50 CM/S
RIGHT ICA/CCA SYS: 1.26
RIGHT SUBCLAVIAN SYS: 94 CM/S
RIGHT VERTEBRAL DIAS: 18 CM/S
RIGHT VERTEBRAL SYS: 49 CM/S

## 2020-11-16 PROCEDURE — 93880 EXTRACRANIAL BILAT STUDY: CPT

## 2020-11-16 PROCEDURE — 99024 POSTOP FOLLOW-UP VISIT: CPT | Performed by: ORTHOPAEDIC SURGERY

## 2020-11-16 NOTE — PATIENT INSTRUCTIONS
Knee Arthritis: Care Instructions Your Care Instructions Knee arthritis is a breakdown of the cartilage that cushions your knee joint. When the cartilage wears down, your bones rub against each other. This causes pain and stiffness. Knee arthritis tends to get worse with time. Treatment for knee arthritis involves reducing pain, making the leg muscles stronger, and staying at a healthy body weight. The treatment usually does not improve the health of the cartilage, but it can reduce pain and improve how well your knee works. You can take simple measures to protect your knee joints, ease your pain, and help you stay active. Follow-up care is a key part of your treatment and safety. Be sure to make and go to all appointments, and call your doctor if you are having problems. It's also a good idea to know your test results and keep a list of the medicines you take. How can you care for yourself at home? · Know that knee arthritis will cause more pain on some days than on others. · Stay at a healthy weight. Lose weight if you are overweight. When you stand up, the pressure on your knees from every pound of body weight is multiplied four times. So if you lose 10 pounds, you will reduce the pressure on your knees by 40 pounds. · Talk to your doctor or physical therapist about exercises that will help ease joint pain. ? Stretch to help prevent stiffness and to prevent injury before you exercise. You may enjoy gentle forms of yoga to help keep your knee joints and muscles flexible. ? Walk instead of jog. 
? Ride a bike. This makes your thigh muscles stronger and takes pressure off your knee. ? Wear well-fitting and comfortable shoes. ? Exercise in chest-deep water. This can help you exercise longer with less pain. ? Avoid exercises that include squatting or kneeling. They can put a lot of strain on your knees.  
? Talk to your doctor to make sure that the exercise you do is not making the arthritis worse. · Do not sit for long periods of time. Try to walk once in a while to keep your knee from getting stiff. · Ask your doctor or physical therapist whether shoe inserts may reduce your arthritis pain. · If you can afford it, get new athletic shoes at least every year. This can help reduce the strain on your knees. · Use a device to help you do everyday activities. ? A cane or walking stick can help you keep your balance when you walk. Hold the cane or walking stick in the hand opposite the painful knee. ? If you feel like you may fall when you walk, try using crutches or a front-wheeled walker. These can prevent falls that could cause more damage to your knee. ? A knee brace may help keep your knee stable and prevent pain. ? You also can use other things to make life easier, such as a higher toilet seat and handrails in the bathtub or shower. · Take pain medicines exactly as directed. ? Do not wait until you are in severe pain. You will get better results if you take it sooner. ? If you are not taking a prescription pain medicine, take an over-the-counter medicine such as acetaminophen (Tylenol), ibuprofen (Advil, Motrin), or naproxen (Aleve). Read and follow all instructions on the label. ? Do not take two or more pain medicines at the same time unless the doctor told you to. Many pain medicines have acetaminophen, which is Tylenol. Too much acetaminophen (Tylenol) can be harmful. ? Tell your doctor if you take a blood thinner, have diabetes, or have allergies to shellfish. · Ask your doctor if you might benefit from a shot of steroid medicine into your knee. This may provide pain relief for several months. · Many people take the supplements glucosamine and chondroitin for osteoarthritis. Some people feel they help, but the medical research does not show that they work. Talk to your doctor before you take these supplements. When should you call for help? Call your doctor now or seek immediate medical care if: 
  · You have sudden swelling, warmth, or pain in your knee.  
  · You have knee pain and a fever or rash.  
  · You have such bad pain that you cannot use your knee. Watch closely for changes in your health, and be sure to contact your doctor if you have any problems. Where can you learn more? Go to http://www.gray.com/ Enter F554 in the search box to learn more about \"Knee Arthritis: Care Instructions. \" Current as of: December 9, 2019               Content Version: 12.6 © 3908-1123 [email or web address]. Care instructions adapted under license by [employer] (which disclaims liability or warranty for this information). If you have questions about a medical condition or this instruction, always ask your healthcare professional. [address] any warranty or liability for your use of this information.

## 2020-11-16 NOTE — PROGRESS NOTES
Name: Claudia Samaniego    : 1945     Service Dept: [address] [place] [address] and Sports Medicine    Patient's Pharmacies:    [facility] [address] [facility] [address] [place]  [address] [place] [address]  [place] [address] [address]  Phone: [phone] Fax: [phone]       Chief Complaint   Patient presents with    Surgical Follow-up        There were no vitals taken for this visit. Allergies   Allergen Reactions    Cefuroxime Axetil Anaphylaxis    Cephalexin Other (comments)    Eszopiclone Other (comments)     Cannot take generic version of Lunesta  Cannot take generic version of Lunesta          Current Outpatient Medications   Medication Sig Dispense Refill    apixaban (Eliquis) 5 mg tablet Eliquis 5 mg tablet      amLODIPine (NORVASC) 10 mg tablet Take 1 Tab by mouth daily.  ALPRAZolam (XANAX) 0.25 mg tablet alprazolam 0.25 mg tablet   TAKE 1 TABLET BY MOUTH TWICE DAILY AS NEEDED FOR SEVERE ANXIETY AND PANIC AS DIRECTED      acetaminophen-codeine (TYLENOL #3) 300-30 mg per tablet acetaminophen 300 mg-codeine 30 mg tablet      busPIRone (BUSPAR) 15 mg tablet Take 15 mg by mouth two (2) times a day.  desmopressin (Nocdurna, men,) 55.3 mcg TbDi 55.3 mcg by SubLINGual route.  eszopiclone (LUNESTA) 3 mg tablet Take 3 mg by mouth.  gabapentin (NEURONTIN) 300 mg capsule Take 900 mg by mouth.  HYDROcodone-acetaminophen (Norco) 7.5-325 mg per tablet Norco 7.5 mg-325 mg tablet   Take 1 tablet 3 times a day by oral route as needed for 20 days.  metoprolol tartrate (LOPRESSOR) 25 mg tablet       naloxone (Narcan) 4 mg/actuation nasal spray Narcan 4 mg/actuation nasal spray      sertraline (ZOLOFT) 50 mg tablet Take 50 mg by mouth daily.  tadalafiL (CIALIS) 5 mg tablet Take 5 mg by mouth daily. There is no problem list on file for this patient.        Family History   Problem Relation Age of Onset    No Known Problems Mother     No Known Problems Father Social History     Socioeconomic History    Marital status:      Spouse name: Not on file    Number of children: Not on file    Years of education: Not on file    Highest education level: Not on file   Tobacco Use    Smoking status: Former Smoker    Smokeless tobacco: Never Used   Substance and Sexual Activity    Alcohol use: Never     Frequency: Never        History reviewed. No pertinent surgical history. Past Medical History:   Diagnosis Date    Arthritis     Hypertension         I have reviewed and agree with PFSH and ROS and intake form in chart and the record. Review of Systems:   Patient is a pleasant appearing individual, appropriately dressed, well hydrated, well nourished, who is alert, appropriately oriented for age, and in no acute distress with a normal gait and normal affect who does not appear to be in any significant pain. Physical Exam:  Left knee - Neurovascularly intact with good cap refill, full range of motion and full strength, well healed incision noted, no swelling, no erythema, no instability. Right knee - Decrease range of motion with flexion, Some crepitation, Grossly neurovascularly intact, Good cap refill, No skin lesion, Moderate swelling, No gross instability, Some quadriceps weakness         Encounter Diagnoses     ICD-10-CM ICD-9-CM   1. Primary osteoarthritis of left knee  M17.12 715.16          HPI:  The patient is here status post left total knee replacement, doing well, still having a little bit of soreness. Assessment/Plan:  Plan at this point, activities as tolerated started, physical therapy program.  We will see the patient back as needed or in 3 months and go from there. If the patient gets worse, he is to give me a call. No restrictions in the meantime. Return to Office: Follow-up and Dispositions    · Return in about 3 months (around 2/16/2021). Scribed by Carmine Urena as dictated by Shireen Nelson.  Koki Pugh, MD.    Documentation True and Accepted Leroy Linda MD

## 2020-11-17 ENCOUNTER — TRANSCRIBE ORDER (OUTPATIENT)
Dept: SCHEDULING | Age: 75
End: 2020-11-17

## 2020-11-17 DIAGNOSIS — G40.209 COMPLEX PARTIAL SEIZURES WITH CONSCIOUSNESS IMPAIRED (HCC): Primary | ICD-10-CM

## 2020-11-25 ENCOUNTER — HOSPITAL ENCOUNTER (OUTPATIENT)
Dept: MRI IMAGING | Age: 75
Discharge: HOME | End: 2020-11-25
Payer: MEDICARE

## 2020-11-25 DIAGNOSIS — G40.209 COMPLEX PARTIAL SEIZURES WITH CONSCIOUSNESS IMPAIRED (HCC): ICD-10-CM

## 2020-11-25 PROCEDURE — 70551 MRI BRAIN STEM W/O DYE: CPT

## 2020-12-28 ENCOUNTER — OFFICE VISIT (OUTPATIENT)
Dept: ORTHOPEDIC SURGERY | Age: 75
End: 2020-12-28
Payer: MEDICARE

## 2020-12-28 VITALS — BODY MASS INDEX: 24.25 KG/M2 | HEIGHT: 68 IN | WEIGHT: 160 LBS

## 2020-12-28 DIAGNOSIS — M17.12 PRIMARY OSTEOARTHRITIS OF LEFT KNEE: Primary | ICD-10-CM

## 2020-12-28 PROCEDURE — 99213 OFFICE O/P EST LOW 20 MIN: CPT | Performed by: ORTHOPAEDIC SURGERY

## 2020-12-28 PROCEDURE — G8536 NO DOC ELDER MAL SCRN: HCPCS | Performed by: ORTHOPAEDIC SURGERY

## 2020-12-28 PROCEDURE — 3017F COLORECTAL CA SCREEN DOC REV: CPT | Performed by: ORTHOPAEDIC SURGERY

## 2020-12-28 PROCEDURE — G8427 DOCREV CUR MEDS BY ELIG CLIN: HCPCS | Performed by: ORTHOPAEDIC SURGERY

## 2020-12-28 PROCEDURE — G8420 CALC BMI NORM PARAMETERS: HCPCS | Performed by: ORTHOPAEDIC SURGERY

## 2020-12-28 PROCEDURE — 1101F PT FALLS ASSESS-DOCD LE1/YR: CPT | Performed by: ORTHOPAEDIC SURGERY

## 2020-12-28 PROCEDURE — G8432 DEP SCR NOT DOC, RNG: HCPCS | Performed by: ORTHOPAEDIC SURGERY

## 2020-12-28 NOTE — PROGRESS NOTES
Name: Luci Mcintosh    : 1945     Service Dept: [address] [place] [address] and Sports Medicine    Patient's Pharmacies:    [facility] [address] [facility], [address] [place]  [address]  [place] [address] [address]  Phone: [phone] Fax: [phone]       Chief Complaint   Patient presents with    Knee Pain        Visit Vitals  Ht 5' 8\" (1.727 m)   Wt 160 lb (72.6 kg)   BMI 24.33 kg/m²      Allergies   Allergen Reactions    Cefuroxime Axetil Anaphylaxis    Cephalexin Other (comments)    Eszopiclone Other (comments)     Cannot take generic version of Lunesta  Cannot take generic version of Lunesta        Current Outpatient Medications   Medication Sig Dispense Refill    apixaban (Eliquis) 5 mg tablet Eliquis 5 mg tablet      amLODIPine (NORVASC) 10 mg tablet Take 1 Tab by mouth daily.  ALPRAZolam (XANAX) 0.25 mg tablet alprazolam 0.25 mg tablet   TAKE 1 TABLET BY MOUTH TWICE DAILY AS NEEDED FOR SEVERE ANXIETY AND PANIC AS DIRECTED      acetaminophen-codeine (TYLENOL #3) 300-30 mg per tablet acetaminophen 300 mg-codeine 30 mg tablet      busPIRone (BUSPAR) 15 mg tablet Take 15 mg by mouth two (2) times a day.  desmopressin (Nocdurna, men,) 55.3 mcg TbDi 55.3 mcg by SubLINGual route.  eszopiclone (LUNESTA) 3 mg tablet Take 3 mg by mouth.  gabapentin (NEURONTIN) 300 mg capsule Take 900 mg by mouth.  HYDROcodone-acetaminophen (Norco) 7.5-325 mg per tablet Norco 7.5 mg-325 mg tablet   Take 1 tablet 3 times a day by oral route as needed for 20 days.  metoprolol tartrate (LOPRESSOR) 25 mg tablet       naloxone (Narcan) 4 mg/actuation nasal spray Narcan 4 mg/actuation nasal spray      sertraline (ZOLOFT) 50 mg tablet Take 50 mg by mouth daily.  tadalafiL (CIALIS) 5 mg tablet Take 5 mg by mouth daily. There is no problem list on file for this patient.      Family History   Problem Relation Age of Onset    No Known Problems Mother     No Known Problems Father       Social History     Socioeconomic History    Marital status:      Spouse name: Not on file    Number of children: Not on file    Years of education: Not on file    Highest education level: Not on file   Tobacco Use    Smoking status: Former Smoker    Smokeless tobacco: Never Used   Substance and Sexual Activity    Alcohol use: Never     Frequency: Never      History reviewed. No pertinent surgical history. Past Medical History:   Diagnosis Date    Arthritis     Hypertension         I have reviewed and agree with PFSH and ROS and intake form in chart and the record. Review of Systems:   Patient is a pleasant appearing individual, appropriately dressed, well hydrated, well nourished, who is alert, appropriately oriented for age, and in no acute distress with a normal gait and normal affect who does not appear to be in any significant pain. Physical Exam:  Left knee - Neurovascularly intact with good cap refill, full range of motion and full strength, well healed incision noted, no swelling, no erythema, no instability. Right knee - Decrease range of motion with flexion, Some crepitation, Grossly neurovascularly intact, Good cap refill, No skin lesion, Moderate swelling, No gross instability, Some quadriceps weakness     Encounter Diagnoses     ICD-10-CM ICD-9-CM   1. Primary osteoarthritis of left knee  M17.12 715.16       HPI:  The patient is status post left total knee replacement, sharp, throbbing pain, progressively getting worse. Pain is 10/10. He continues to have numbness, tingling and weakness in legs. He has got a history of lumbar fusion surgery. I told him I think it is primarily coming from his back. ROS:  10-point review of systems is unremarkable. X-rays of the left knee, AP, lateral and oblique, show well-placed prosthesis. Assessment/Plan:  I will see him back as needed. If he gets worse, he is to give me a call.   He needs to see a spine specialist.        Return to Office: Follow-up and Dispositions    · Return in about 1 year (around 12/28/2021) for w/ X-rays. Scribed by Yojana Churchill MD as dictated by Anrel Weiner. Lali Baker MD.  Documentation True and Accepted Leroy Baker MD

## 2020-12-28 NOTE — PATIENT INSTRUCTIONS
Knee Arthritis: Care Instructions Your Care Instructions Knee arthritis is a breakdown of the cartilage that cushions your knee joint. When the cartilage wears down, your bones rub against each other. This causes pain and stiffness. Knee arthritis tends to get worse with time. Treatment for knee arthritis involves reducing pain, making the leg muscles stronger, and staying at a healthy body weight. The treatment usually does not improve the health of the cartilage, but it can reduce pain and improve how well your knee works. You can take simple measures to protect your knee joints, ease your pain, and help you stay active. Follow-up care is a key part of your treatment and safety. Be sure to make and go to all appointments, and call your doctor if you are having problems. It's also a good idea to know your test results and keep a list of the medicines you take. How can you care for yourself at home? · Know that knee arthritis will cause more pain on some days than on others. · Stay at a healthy weight. Lose weight if you are overweight. When you stand up, the pressure on your knees from every pound of body weight is multiplied four times. So if you lose 10 pounds, you will reduce the pressure on your knees by 40 pounds. · Talk to your doctor or physical therapist about exercises that will help ease joint pain. ? Stretch to help prevent stiffness and to prevent injury before you exercise. You may enjoy gentle forms of yoga to help keep your knee joints and muscles flexible. ? Walk instead of jog. 
? Ride a bike. This makes your thigh muscles stronger and takes pressure off your knee. ? Wear well-fitting and comfortable shoes. ? Exercise in chest-deep water. This can help you exercise longer with less pain. ? Avoid exercises that include squatting or kneeling. They can put a lot of strain on your knees. ? Talk to your doctor to make sure that the exercise you do is not making the arthritis worse. · Do not sit for long periods of time. Try to walk once in a while to keep your knee from getting stiff. · Ask your doctor or physical therapist whether shoe inserts may reduce your arthritis pain. · If you can afford it, get new athletic shoes at least every year. This can help reduce the strain on your knees. · Use a device to help you do everyday activities. ? A cane or walking stick can help you keep your balance when you walk. Hold the cane or walking stick in the hand opposite the painful knee. ? If you feel like you may fall when you walk, try using crutches or a front-wheeled walker. These can prevent falls that could cause more damage to your knee. ? A knee brace may help keep your knee stable and prevent pain. ? You also can use other things to make life easier, such as a higher toilet seat and handrails in the bathtub or shower. · Take pain medicines exactly as directed. ? Do not wait until you are in severe pain. You will get better results if you take it sooner. ? If you are not taking a prescription pain medicine, take an over-the-counter medicine such as acetaminophen (Tylenol), ibuprofen (Advil, Motrin), or naproxen (Aleve). Read and follow all instructions on the label. ? Do not take two or more pain medicines at the same time unless the doctor told you to. Many pain medicines have acetaminophen, which is Tylenol. Too much acetaminophen (Tylenol) can be harmful. ? Tell your doctor if you take a blood thinner, have diabetes, or have allergies to shellfish. · Ask your doctor if you might benefit from a shot of steroid medicine into your knee. This may provide pain relief for several months. · Many people take the supplements glucosamine and chondroitin for osteoarthritis. Some people feel they help, but the medical research does not show that they work. Talk to your doctor before you take these supplements. When should you call for help? Call your doctor now or seek immediate medical care if: 
  · You have sudden swelling, warmth, or pain in your knee.  
  · You have knee pain and a fever or rash.  
  · You have such bad pain that you cannot use your knee. Watch closely for changes in your health, and be sure to contact your doctor if you have any problems. Where can you learn more? Go to http://www.gray.com/ Enter X243 in the search box to learn more about \"Knee Arthritis: Care Instructions. \" Current as of: December 9, 2019               Content Version: 12.6 © 0738-1021 [employer]. Care instructions adapted under license by [employer] (which disclaims liability or warranty for this information). If you have questions about a medical condition or this instruction, always ask your healthcare professional. [address] any warranty or liability for your use of this information.

## 2021-09-14 NOTE — PROGRESS NOTES
[facility], [address] [place] [address], [address] [place] [address]  Phone: [phone]    Fax: [phone]         Speech Pathology -- Discharge Summary    Name: Cuate Brandt        Date: 20   : 1945   MD: Jonas Haro MD    Treatment Diagnosis: r41.89   Start of Care: 10/14/20    Visits from Start of Care: 9  Missed Visits: 0    Summary of Care: Patient participated in cognitive tasks targeting naming, working memory, delayed recall of auditory information and reasoning. Patient with improved MOCA score from  to . Patient reports recent onset of increased fatigue and appetite loss. ST encouraged patient to consult with PCP concerning new symptoms. Patient will be discharged at this time. Assessment / Recommendations:   Discontinue therapy. Progressing towards or have reached established goals. Patient educated on returning to therapy as needed pending PCP approval and orders.          Cadence Garrison, CCC-SLP  2020

## 2022-07-18 NOTE — PROGRESS NOTES
[address] [place] [address] PHYSICAL THERAPY  [address] [place] [address] Dr [place], [facility], [facility] * Phone: [phone] * Fax: [phone]  DISCHARGE SUMMARY FOR PHYSICAL THERAPY          Patient Name: Davion Joya : 1945   Treatment/Medical Diagnosis: Pain in left knee [M25.562]   Onset Date: 2020    Referral Source: Ten Mccabe MD [facility]): 2020   Prior Hospitalization: See Medical History Provider #: [id number]   Prior Level of Function: independent   Comorbidities: HTN, Arthritis, Lumbar fusion, RTC repair (2019)   Medications: Verified on Patient Summary List           Subjective:  Pt states that his knee has felt better after \"massage\" last week and that he has been able to walk with less pain. Objective Measures:                                                  AROM                      PROM                   Strength (1-5)      Left Right Left Right Left Right   Hip Flexion [facility] 4+/5 WFL                 WFL   Knee Flexion 122  WFL 5/5 WFL     Extension -5 WFL 0 WFL 5-/5 WFL             Goal Status Final:  Short Term Goals:  1. Pt will be independent with HEP to improve L knee range of motion and strength in 3 weeks. MET (2020)     2. Pt will demonstrate L knee PROM 0-120 degrees for improved ADL efficiency in 3 weeks. MET. (10/9/2020)     3. Pt will report no greater than 6/10 pain in L knee with daily activity in 3 weeks. MET.  (10/9/2020)        Long Term Goals:  1. Pt will improve L knee strength to 5/5 for improved ability to rise from seated position without difficulty in 6 weeks. Partially met, as listed above. Pt demonstrates less difficulty with rising from seated position today.     2. Pt will improve L knee AROM to 0-120 degrees for improved ability to climb steps in/out of home in 6 weeks. Partially met, AROM: -5-122 degrees. Step negotiation in/out of home independent.     3.  Pt will report pain-free L knee mobility for ability to Patient completed road test. Walked with steady gait.   return to hobbies such as walking for exercise in 6 weeks. Progressing, pt reports 2/10 pain in L knee today, less palpable tenderness along medial patellar border. Pt also instructed in scar massage to aid in desensitization. Pt has been able to increase distance for walking for exercise.       Key Functional Changes/Progress: Pt is s/p L TKA 8/17/2020 and has made measurable improvements in L knee mobility, LE strength and functional independence over time in outpatient rehab. Pt is independent with HEP, verbalizes understanding on adherence and on ability to return if concerns arise. Assessments/Recommendations: Discontinue therapy. Progressing towards or have reached established goals. If you have any questions/comments please contact us directly at [phone]. Thank you for allowing us to assist in the care of your patient. Therapist Signature: Kasia Joe PT, DPT Date: 11/2/2020   Reporting Period: 8/18/2020 - 11/2/2020 Time: 8:20 PM      Certification Period: 8/18/2020 - 12/28/2020       NOTE TO PHYSICIAN:  PLEASE COMPLETE THE ORDERS BELOW AND FAX TO   [facility]: [phone]. If you are unable to process this request in 24 hours please contact our office: [phone].    ___ I have read the above report and request that my patient be discharged from therapy.      Physician Signature:        Date:       Time: